# Patient Record
Sex: FEMALE | Race: BLACK OR AFRICAN AMERICAN | NOT HISPANIC OR LATINO | Employment: FULL TIME | URBAN - METROPOLITAN AREA
[De-identification: names, ages, dates, MRNs, and addresses within clinical notes are randomized per-mention and may not be internally consistent; named-entity substitution may affect disease eponyms.]

---

## 2022-02-24 ENCOUNTER — EH NON-PROVIDER (OUTPATIENT)
Dept: OCCUPATIONAL MEDICINE | Facility: CLINIC | Age: 32
End: 2022-02-24

## 2022-02-24 ENCOUNTER — HOSPITAL ENCOUNTER (OUTPATIENT)
Facility: MEDICAL CENTER | Age: 32
End: 2022-02-24
Attending: NURSE PRACTITIONER
Payer: COMMERCIAL

## 2022-02-24 ENCOUNTER — EMPLOYEE HEALTH (OUTPATIENT)
Dept: OCCUPATIONAL MEDICINE | Facility: CLINIC | Age: 32
End: 2022-02-24

## 2022-02-24 DIAGNOSIS — Z02.1 PRE-EMPLOYMENT DRUG SCREENING: Primary | ICD-10-CM

## 2022-02-24 DIAGNOSIS — Z02.1 PRE-EMPLOYMENT DRUG SCREENING: ICD-10-CM

## 2022-02-24 DIAGNOSIS — Z02.1 PRE-EMPLOYMENT HEALTH SCREENING EXAMINATION: ICD-10-CM

## 2022-02-24 LAB
AMP AMPHETAMINE: NORMAL
BAR BARBITURATES: NORMAL
BZO BENZODIAZEPINES: NORMAL
COC COCAINE: NORMAL
INT CON NEG: NORMAL
INT CON POS: NORMAL
MDMA ECSTASY: NORMAL
MET METHAMPHETAMINES: NORMAL
MTD METHADONE: NORMAL
OPI OPIATES: NORMAL
OXY OXYCODONE: NORMAL
PCP PHENCYCLIDINE: NORMAL
POC URINE DRUG SCREEN OCDRS: NEGATIVE
THC: NORMAL

## 2022-02-24 PROCEDURE — 86787 VARICELLA-ZOSTER ANTIBODY: CPT | Performed by: NURSE PRACTITIONER

## 2022-02-24 PROCEDURE — 94375 RESPIRATORY FLOW VOLUME LOOP: CPT | Performed by: NURSE PRACTITIONER

## 2022-02-24 PROCEDURE — 86765 RUBEOLA ANTIBODY: CPT | Performed by: NURSE PRACTITIONER

## 2022-02-24 PROCEDURE — 8915 PR COMPREHENSIVE PHYSICAL: Performed by: NURSE PRACTITIONER

## 2022-02-24 PROCEDURE — 86480 TB TEST CELL IMMUN MEASURE: CPT | Performed by: NURSE PRACTITIONER

## 2022-02-24 PROCEDURE — 90715 TDAP VACCINE 7 YRS/> IM: CPT | Performed by: NURSE PRACTITIONER

## 2022-02-24 PROCEDURE — 86735 MUMPS ANTIBODY: CPT | Performed by: NURSE PRACTITIONER

## 2022-02-24 PROCEDURE — 86762 RUBELLA ANTIBODY: CPT | Performed by: NURSE PRACTITIONER

## 2022-02-24 PROCEDURE — 80305 DRUG TEST PRSMV DIR OPT OBS: CPT | Performed by: NURSE PRACTITIONER

## 2022-02-25 LAB
GAMMA INTERFERON BACKGROUND BLD IA-ACNC: 0.04 IU/ML
M TB IFN-G BLD-IMP: NEGATIVE
M TB IFN-G CD4+ BCKGRND COR BLD-ACNC: 0.01 IU/ML
MEV IGG SER IA-ACNC: 2.68
MITOGEN IGNF BCKGRD COR BLD-ACNC: >10 IU/ML
MUV IGG SER IA-ACNC: 2.9
QFT TB2 - NIL TBQ2: 0 IU/ML
RUBV AB SER QL: 256 IU/ML
VZV IGG SER IA-ACNC: 0.09

## 2022-03-09 ENCOUNTER — TELEPHONE (OUTPATIENT)
Dept: OCCUPATIONAL MEDICINE | Facility: CLINIC | Age: 32
End: 2022-03-09

## 2022-03-10 ENCOUNTER — TELEPHONE (OUTPATIENT)
Dept: OCCUPATIONAL MEDICINE | Facility: CLINIC | Age: 32
End: 2022-03-10
Payer: COMMERCIAL

## 2022-03-10 NOTE — TELEPHONE ENCOUNTER
Phone Number Called: [phone] (home)       Call outcome: Did not leave a detailed message. Requested patient to call back.    Message: LEFT A VOICE MESSAGE TO CALL BACK IN REGARDS TO THE [facility]ITER.

## 2022-04-20 ENCOUNTER — EH NON-PROVIDER (OUTPATIENT)
Dept: OCCUPATIONAL MEDICINE | Facility: CLINIC | Age: 32
End: 2022-04-20

## 2022-04-20 DIAGNOSIS — Z23 NEED FOR VACCINATION: Primary | ICD-10-CM

## 2022-04-20 PROCEDURE — 90716 VAR VACCINE LIVE SUBQ: CPT | Performed by: NURSE PRACTITIONER

## 2022-04-27 ENCOUNTER — APPOINTMENT (OUTPATIENT)
Dept: OCCUPATIONAL MEDICINE | Facility: CLINIC | Age: 32
End: 2022-04-27

## 2022-07-28 ENCOUNTER — EH NON-PROVIDER (OUTPATIENT)
Dept: OCCUPATIONAL MEDICINE | Facility: CLINIC | Age: 32
End: 2022-07-28
Payer: COMMERCIAL

## 2022-07-28 DIAGNOSIS — Z02.89 ENCOUNTER FOR OCCUPATIONAL HEALTH ASSESSMENT: Primary | ICD-10-CM

## 2022-07-28 PROCEDURE — 90716 VAR VACCINE LIVE SUBQ: CPT | Performed by: NURSE PRACTITIONER

## 2023-02-07 ENCOUNTER — GYNECOLOGY VISIT (OUTPATIENT)
Dept: OBGYN | Facility: CLINIC | Age: 33
End: 2023-02-07
Payer: COMMERCIAL

## 2023-02-07 VITALS
HEIGHT: 64 IN | SYSTOLIC BLOOD PRESSURE: 124 MMHG | BODY MASS INDEX: 25.27 KG/M2 | DIASTOLIC BLOOD PRESSURE: 86 MMHG | WEIGHT: 148 LBS

## 2023-02-07 DIAGNOSIS — Z31.69 ENCOUNTER FOR INFERTILITY COUNSELING: Primary | ICD-10-CM

## 2023-02-07 DIAGNOSIS — Z98.890 HISTORY OF ELECTIVE ABORTION: ICD-10-CM

## 2023-02-07 PROCEDURE — 99203 OFFICE O/P NEW LOW 30 MIN: CPT | Performed by: OBSTETRICS & GYNECOLOGY

## 2023-02-07 NOTE — PROGRESS NOTES
"New GYN Problem Note    CC:   Family planning    HPI:   Patient is a 32 y.o.  who presents complaining of concerns for getting pregnant.  She states right off that her and her partner of 8 months have not been TRYING to get pregnant but that without preventing they haven't gotten pregnant and she wants to make sure she is able to.  She has, however, been pregnant 5 times, 4 of which she had an elective surgical  for.  The most recent  was in 2018 and she reports it was \"far more painful than the others\" and since that time she has been concerned about that.  Her only child is now 15 years old.      She initially reports regular monthly periods but further history then states some irregularity and some missed periods. Her understanding and relay of this information is somewhat confusing.       GYNECOLOGIC HISTORY:   Current Sexual Activity: yes  History of sexually transmitted diseases? No  Abnormal discharge? No     Menstrual History  Patient's last menstrual period was Patient's last menstrual period was 2023 (exact date).  Periods are mostly regular  lasting 3-5 days.  She will begin to track cycle length    Clots or heavy flow: No  Intermenstrual bleeding/spotting: No  Sexual problems: No  Significant pelvic pain: No  Bothersome PMS: No  Bothersome menopausal symptoms: No     Contraception  none     Pap History  [facility]  Hx Moderate or Severe Dysplasia : No     Sexually active:    Social History     Substance and Sexual Activity   Sexual Activity Yes    Partners: Male    Birth control/protection: None       OBSTETRIC HISTORY:  OB History    Para Term  AB Living   5 1 1   4 1   SAB IAB Ectopic Molar Multiple Live Births             1      # Outcome Date GA Lbr Tahir/2nd Weight Sex Delivery Anes PTL Lv   5 AB  6w0d          4 AB 2016 6w0d          3 AB  6w0d          2 Term 08 40w0d  7 lb 8 oz F Vag-Spont EPI N NIKA   1 AB  " "6w0d              MEDICAL HISTORY:  History reviewed. No pertinent past medical history.    SURGICAL HISTORY:  History reviewed. No pertinent surgical history.    FAMILY HISTORY:  Family History   Problem Relation Age of Onset    No Known Problems Mother     No Known Problems Father        ALLERGIES / REACTIONS:  No Known Allergies    SOCIAL HISTORY:   reports that she has quit smoking. Her smoking use included cigarettes. She has never used smokeless tobacco. She reports current alcohol use. She reports current drug use. Drug: Marijuana.    ROS:   Positive ROS: none  Gen: no fevers or chills, no significant weight loss or gain, excessive fatigue  Respiratory:  no cough or dyspnea  Cardiac:  no chest pain, no palpitations, no syncope  Breast: no breast discharge, pain, lump or skin changes  GI:  no heartburn, no abdominal pain, no nausea or vomiting  Urinary: no dysuria, urgency, frequency, incontinence   Psych: no depression or anxiety  Neuro: no migraines with aura, fainting spells, numbness or tingling  Extremities: no joint pain, persistently swollen ankles, recurrent leg cramps       PHYSICAL EXAMINATION:  Vital Signs:   Vitals:    02/07/23 0919   BP: 124/86   BP Location: Right arm   Patient Position: Sitting   BP Cuff Size: Adult   Weight: 148 lb   Height: 5' 4\"     Body mass index is 25.4 kg/m².  Constitutional: The patient is well developed and well nourished.  Psychiatric: Patient is oriented to time place and person.   Skin: No rash observed.  Neck: Neck appears symmetric.  Respiratory: normal effort  Abdomen: Soft, non-tender.  Pelvic Exam:     External female genitalia: normal appearance    Urethra - no lesions, no erythema    Vagina: moist, pink, normal ruggae    Cervix: pink, smooth, no lesions, no CMT; no obvious scarring noted    Uterus - non-tender, normal size, shape, contour, mobile, anteverted    Ovaries: non-tender, no appreciable masses  Extremeties: Legs are symmetric and without tenderness. " There is no edema present.    ASSESSMENT AND PLAN:  32 y.o.       1. History of elective    - discussed she clearly CAN get pregnant given her history.  Only concern would be uterine/endometrial injury or scarring from repeated abortions. Will send to [facility] for [facility] evaluation  - Referral to Infertility    2. Encounter for infertility counseling   - extensively discussed menstrual cycle and timed intercourse   - encouraged pt to track cycle length for better understanding of her ovulatory pattern.  Can then begin to try timed intercourse and use basal body temperature measurements or ovulation predictor kits   - will have partner get semen analysis from [facility] as well to exclude male factor (Malik Springerpaolo  )   - overall offered reassurance at this time and future option to assist with reproduction with OI meds if >1 year of concerted trying with no conception    - Referral to Infertility    [facility]    Selene Calloway D.O.

## 2023-02-07 NOTE — PATIENT INSTRUCTIONS
[facility]  Located in: [facility]  Address: [address], [place] [address]  Hours:   Open ? Closes 4?PM    Phone: [phone]    [facility] and STD screenings and medical records from abortions

## 2023-03-13 ENCOUNTER — HOSPITAL ENCOUNTER (EMERGENCY)
Facility: MEDICAL CENTER | Age: 33
End: 2023-03-13
Attending: EMERGENCY MEDICINE
Payer: COMMERCIAL

## 2023-03-13 VITALS
WEIGHT: 151.46 LBS | HEART RATE: 98 BPM | OXYGEN SATURATION: 100 % | TEMPERATURE: 96.7 F | SYSTOLIC BLOOD PRESSURE: 135 MMHG | RESPIRATION RATE: 18 BRPM | DIASTOLIC BLOOD PRESSURE: 99 MMHG | HEIGHT: 62 IN | BODY MASS INDEX: 27.87 KG/M2

## 2023-03-13 DIAGNOSIS — S61.210A LACERATION OF RIGHT INDEX FINGER WITHOUT FOREIGN BODY WITHOUT DAMAGE TO NAIL, INITIAL ENCOUNTER: ICD-10-CM

## 2023-03-13 PROCEDURE — 304999 HCHG REPAIR-SIMPLE/INTERMED LEVEL 1

## 2023-03-13 PROCEDURE — 99282 EMERGENCY DEPT VISIT SF MDM: CPT

## 2023-03-13 PROCEDURE — 303747 HCHG EXTRA SUTURE

## 2023-03-13 PROCEDURE — 304217 HCHG IRRIGATION SYSTEM

## 2023-03-13 RX ORDER — LIDOCAINE HYDROCHLORIDE 20 MG/ML
20 INJECTION, SOLUTION INFILTRATION; PERINEURAL ONCE
Status: DISCONTINUED | OUTPATIENT
Start: 2023-03-13 | End: 2023-03-13 | Stop reason: HOSPADM

## 2023-03-13 NOTE — ED PROVIDER NOTES
ED Provider Note    Scribed for Alyx Landis M.D. by Corine Laughlin. 3/13/2023, 7:31 AM.    Primary care provider: Pcp Not In Computer  Means of arrival: Walk in  History obtained from: patient   History limited by: none    CHIEF COMPLAINT  Chief Complaint   Patient presents with    Laceration     R index finger laceration, was washing dishes and cut finger on a knife, unsure if she was able to move it afterwards, bleeding controlled with pressure dressing in triage, unsure of last tetanus        HPI/ROS  Desire Mack is a 32 y.o. female who presents to the Emergency Department laceration onset a few hours ago. Patient describes she was washing a knife and cut her right index finger.  Denies any other injuries.  Reports mild stabbing pain to the site of laceration.  She is unsure of her last tetanus.    Pharmacy team confirms that her Tdap is up-to-date.    EXTERNAL RECORDS REVIEWED  None    LIMITATION TO HISTORY   Select: : None    OUTSIDE HISTORIAN(S):  none      PAST MEDICAL HISTORY   Unknown last tetanus.    SURGICAL HISTORY  patient denies any surgical history    SOCIAL HISTORY  Social History     Tobacco Use    Smoking status: Former     Types: Cigarettes    Smokeless tobacco: Never   Vaping Use    Vaping Use: Every day    Substances: Nicotine, Flavoring    Devices: Pre-filled or refillable cartridge, Pre-filled pod   Substance Use Topics    Alcohol use: Yes     Comment: couple times a month    Drug use: Not Currently     Types: Marijuana      Social History     Substance and Sexual Activity   Drug Use Not Currently    Types: Marijuana       FAMILY HISTORY  Family History   Problem Relation Age of Onset    No Known Problems Mother     No Known Problems Father        CURRENT MEDICATIONS  Home Medications       Reviewed by Herlinda Leon R.N. (Registered Nurse) on 03/13/23 at 0633  Med List Status: Not Addressed     Medication Last Dose Status        Patient Saleem Taking any Medications                      "      ALLERGIES  No Known Allergies    PHYSICAL EXAM  VITAL SIGNS: BP (!) 149/99   Pulse (!) 111   Temp 36 °C (96.8 °F) (Temporal)   Resp 18   Ht 1.575 m (5' 2\")   Wt 68.7 kg (151 lb 7.3 oz)   LMP 03/11/2023   SpO2 100% Comment: Room air  BMI 27.70 kg/m²   Vitals reviewed by myself.  Physical Exam  Nursing note and vitals reviewed.  Constitutional: Well-developed and well-nourished. Anxious appearing.  HENT: Head is normocephalic and atraumatic.  Eyes: extra-ocular movements intact  Cardiovascular: Tachycardic rate and regular rhythm.  Brisk capillary refill present in all distal fingertips  Pulmonary/Chest: Breath sounds normal. No wheezes or rales.   Musculoskeletal: Extremities exhibit normal range of motion without edema.  Patient has full range of motion of the right index finger at the MCP, PIP and DIP joints, see laceration noted below  Neurological: Awake and alert sensation intact in all distal fingertips  Skin: Superficial 3 cm laceration to the proximal palmar aspect of the right index finger. Skin is warm and dry. No rash.     PROCEDURES  Laceration Repair Procedure Note    Indication: Laceration    Procedure: The patient was placed in the appropriate position and anesthesia around the laceration was obtained with a full digital block of the right index finger using 2% Lidocaine without epinephrine. The area was then irrigated with normal saline. The laceration was closed with 4-0 Ethilon using interrupted sutures. There were no additional lacerations requiring repair. The wound area was then dressed with a sterile dressing.      Total repaired wound length: 3 cm.     Other Items: Suture count: 6    The patient tolerated the procedure well.    Complications: None      COURSE & MEDICAL DECISION MAKING    ED Observation Status? No; Patient does not meet criteria for ED Observation.     INITIAL ASSESSMENT AND PLAN    Patient is a 32-year-old female who comes in for evaluation of laceration.  " Differential diagnosis includes laceration, deep structure injury, neurovascular injury.  On exam she is neurovascularly intact.  No evidence of deep structure injury as she has full range of motion at the PIP and DIP joints.  Therefore will repair laceration, patient is amenable to this plan.       REASSESSMENTS   7:33 AM - Patient seen and examined at bedside. Informed the patient we will need to repair her laceration with sutures. Patient verbalizes understanding and agreement to this plan of care.     7:59 AM - Digital block preformed at this time to numb for the laceration repair.    8:13 AM - Laceration repair procedure preformed at this time (see procedure note above for details). Discussed plan for discharge and return precautions. She verbalizes agreement with discharge and plan of care.     ER COURSE AND FINAL DISPOSITION   Patient's initial vitals notable for tachycardia and hypertension, she is having a lot of discomfort and anxiety regarding the laceration.  After the digit was anesthetized she felt greatly improved and tachycardia resolved.  Laceration was repaired by myself, see procedure note above for details.  Patient is given wound care instructions and advised to have sutures removed in 7 days.  She has been given strict return precautions and discharged in stable condition.    ADDITIONAL PROBLEM LIST AND RESOURCE UTILIZATION    Additional problems aside from the chief complaint that I have addressed: None    I have discussed management of the patient with the following physicians and EBENEZER's: none    Discussion of management with other QHP or appropriate source(s): None     Escalation of care considered, and ultimately not performed: Diagnostic imaging    Barriers to care at this time, including but not limited to:  none .     Decision tools and prescription drugs considered including, but not limited to: See above.    Please see review of records as noted above    The patient will return for new  or worsening symptoms and is stable at the time of discharge.    DISPOSITION:  Patient will be discharged home in stable condition.    FOLLOW UP:  [facility]  [address]  [facility] [address]  [phone]          FINAL IMPRESSION  1. Laceration of right index finger without foreign body without damage to nail, initial encounter          Corine GRAY (Scribe), am scribing for, and in the presence of, Alyx Landis M.D..    Electronically signed by: Corine Laughlin (Ianiberick), 3/13/2023    Alyx GRAY M.D. personally performed the services described in this documentation, as scribed by Corine Laughlin in my presence, and it is both accurate and complete.    The note accurately reflects work and decisions made by me.  Alyx Landis M.D.  3/13/2023  12:56 PM

## 2023-03-13 NOTE — ED TRIAGE NOTES
Chief Complaint   Patient presents with    Laceration     R index finger laceration, was washing dishes and cut finger on a knife, unsure if she was able to move it afterwards, bleeding controlled with pressure dressing in triage, unsure of last tetanus         Pt ambulatory to triage for above complaint.       Pt is alert/oriented and follows commands. Pt speaking in full sentences and responds appropriately to questions. No acute distress noted in triage and respirations are even and unlabored.      Pt placed in lobby and educated on triage process. Pt encouraged to alert staff for any changes in condition

## 2023-11-13 ENCOUNTER — OFFICE VISIT (OUTPATIENT)
Dept: MEDICAL GROUP | Facility: PHYSICIAN GROUP | Age: 33
End: 2023-11-13
Payer: COMMERCIAL

## 2023-11-13 ENCOUNTER — HOSPITAL ENCOUNTER (OUTPATIENT)
Facility: MEDICAL CENTER | Age: 33
End: 2023-11-13
Attending: NURSE PRACTITIONER
Payer: COMMERCIAL

## 2023-11-13 VITALS
SYSTOLIC BLOOD PRESSURE: 122 MMHG | HEART RATE: 94 BPM | DIASTOLIC BLOOD PRESSURE: 70 MMHG | WEIGHT: 154 LBS | HEIGHT: 62 IN | TEMPERATURE: 98.3 F | BODY MASS INDEX: 28.34 KG/M2 | OXYGEN SATURATION: 100 %

## 2023-11-13 DIAGNOSIS — Z11.3 ENCOUNTER FOR SCREENING EXAMINATION FOR SEXUALLY TRANSMITTED DISEASE: ICD-10-CM

## 2023-11-13 DIAGNOSIS — R10.2 PELVIC PAIN: ICD-10-CM

## 2023-11-13 DIAGNOSIS — N94.6 DYSMENORRHEA: ICD-10-CM

## 2023-11-13 DIAGNOSIS — N93.0 PCB (POST COITAL BLEEDING): Primary | ICD-10-CM

## 2023-11-13 LAB
APPEARANCE UR: CLEAR
BILIRUB UR STRIP-MCNC: NEGATIVE MG/DL
COLOR UR AUTO: YELLOW
GLUCOSE UR STRIP.AUTO-MCNC: NEGATIVE MG/DL
KETONES UR STRIP.AUTO-MCNC: NEGATIVE MG/DL
LEUKOCYTE ESTERASE UR QL STRIP.AUTO: NEGATIVE
NITRITE UR QL STRIP.AUTO: NEGATIVE
PH UR STRIP.AUTO: 5.5 [PH] (ref 5–8)
POCT INT CON NEG: NEGATIVE
POCT INT CON POS: POSITIVE
POCT URINE PREGNANCY TEST: NEGATIVE
PROT UR QL STRIP: NEGATIVE MG/DL
RBC UR QL AUTO: NORMAL
SP GR UR STRIP.AUTO: 1.3
UROBILINOGEN UR STRIP-MCNC: 0.2 MG/DL

## 2023-11-13 PROCEDURE — 87591 N.GONORRHOEAE DNA AMP PROB: CPT

## 2023-11-13 PROCEDURE — 87086 URINE CULTURE/COLONY COUNT: CPT

## 2023-11-13 PROCEDURE — 87491 CHLMYD TRACH DNA AMP PROBE: CPT

## 2023-11-13 PROCEDURE — 81025 URINE PREGNANCY TEST: CPT | Performed by: NURSE PRACTITIONER

## 2023-11-13 PROCEDURE — 99214 OFFICE O/P EST MOD 30 MIN: CPT | Performed by: NURSE PRACTITIONER

## 2023-11-13 PROCEDURE — 81002 URINALYSIS NONAUTO W/O SCOPE: CPT | Performed by: NURSE PRACTITIONER

## 2023-11-13 PROCEDURE — 3078F DIAST BP <80 MM HG: CPT | Performed by: NURSE PRACTITIONER

## 2023-11-13 PROCEDURE — 3074F SYST BP LT 130 MM HG: CPT | Performed by: NURSE PRACTITIONER

## 2023-11-13 ASSESSMENT — ENCOUNTER SYMPTOMS
VOMITING: 0
NAUSEA: 0
SHORTNESS OF BREATH: 0
CHILLS: 0
ABDOMINAL PAIN: 1
FEVER: 0
HEADACHES: 0
DIZZINESS: 0

## 2023-11-13 ASSESSMENT — PATIENT HEALTH QUESTIONNAIRE - PHQ9: CLINICAL INTERPRETATION OF PHQ2 SCORE: 0

## 2023-11-13 NOTE — PROGRESS NOTES
"Subjective:     CC: STI screening    HPI:   Desire presents today with    Problem   Pcb (Post Coital Bleeding)    Reports 1 episode of brown spotting during and after intercourse yesterday. Reports having unprotected intercourse. States that for the past week, reports some mild lower abdominal cramping.   Hx of STI at age 19. Has a history of ovarian cyst, about 7 years ago.   Denies dysuria, urinary frequency, urinary urgency, hematuria, vaginal discharge, vaginal itching, dyspareunia.      Pelvic Pain    Onset: 1-2 weeks ago  Reports she has been experiencing increased pelvic pain over the past week.   LMP 10/30/2023, states that sometimes interval between menses is irregular (does not track her cycles, but states interval may be longer than 35 days)  Denies nausea/vomiting, changes in bowel movements urinary symptoms or vaginal discharge.      Dysmenorrhea    For the past 2-3 cycles, she has experienced \"excruciating\" pain during menses. Also reports heavy bleeding. Both are a change from her normal. Menses last about 3 days.        No current outpatient medications on file.     No current facility-administered medications for this visit.       ROS:  Review of Systems   Constitutional:  Negative for chills and fever.   Respiratory:  Negative for shortness of breath.    Cardiovascular:  Negative for chest pain.   Gastrointestinal:  Positive for abdominal pain. Negative for nausea and vomiting.   Neurological:  Negative for dizziness and headaches.       Objective:     Exam:  /70 (BP Location: Right arm, Patient Position: Sitting, BP Cuff Size: Adult)   Pulse 94   Temp 36.8 °C (98.3 °F) (Temporal)   Ht 1.575 m (5' 2\")   Wt 69.9 kg (154 lb)   SpO2 100%   BMI 28.17 kg/m²  Body mass index is 28.17 kg/m².    Physical Exam  Constitutional:       General: She is not in acute distress.     Appearance: Normal appearance. She is not ill-appearing.   HENT:      Mouth/Throat:      Mouth: Mucous membranes are " moist.   Eyes:      Conjunctiva/sclera: Conjunctivae normal.      Pupils: Pupils are equal, round, and reactive to light.   Cardiovascular:      Rate and Rhythm: Normal rate and regular rhythm.      Heart sounds: Normal heart sounds.   Pulmonary:      Effort: Pulmonary effort is normal. No respiratory distress.      Breath sounds: Normal breath sounds. No wheezing, rhonchi or rales.   Abdominal:      General: Bowel sounds are normal. There is no distension.      Palpations: Abdomen is soft.      Tenderness: There is abdominal tenderness in the right lower quadrant and suprapubic area. There is no right CVA tenderness, left CVA tenderness or guarding.   Lymphadenopathy:      Cervical: No cervical adenopathy.   Skin:     General: Skin is warm and dry.   Neurological:      General: No focal deficit present.      Mental Status: She is alert and oriented to person, place, and time.   Psychiatric:         Mood and Affect: Mood normal.         Behavior: Behavior normal.       Lab reviewed:   11/13/23 09:48   POC Urine Pregnancy Test Negative     Lab Results   Component Value Date/Time    POCCOLOR YELLOW 11/13/2023 09:49 AM    POCAPPEAR CLEAR 11/13/2023 09:49 AM    POCLEUKEST NEGATIVE 11/13/2023 09:49 AM    POCNITRITE NEGATIVE 11/13/2023 09:49 AM    POCUROBILIGE 0.2 11/13/2023 09:49 AM    POCPROTEIN NEGATIVE 11/13/2023 09:49 AM    POCURPH 5.5 11/13/2023 09:49 AM    POCBLOOD TRACE 11/13/2023 09:49 AM    POCSPGRV 1.30 11/13/2023 09:49 AM    POCKETONES NEGATIVE 11/13/2023 09:49 AM    POCBILIRUBIN NEGATIVE 11/13/2023 09:49 AM    POCGLUCUA NEGATIVE 11/13/2023 09:49 AM      Assessment & Plan:     33 y.o. female with the following -     1. PCB (post coital bleeding)  2. Pelvic pain  New problem. Etiology unclear. POC UA remarkable for trace blood. Given pelvic pain, urine culture ordered. STI screening ordered. I will also order imaging given new symptom of pelvic pain and her history of ovarian cysts. ER precautions discussed.  -  Chlamydia/GC, PCR (Urine); Future  - POCT PREGNANCY  - POCT Urinalysis  - CBC WITH DIFFERENTIAL; Future  - Comp Metabolic Panel; Future  - TSH WITH REFLEX TO FT4; Future  - URINE CULTURE(NEW); Future  - US-PELVIC COMPLETE (TRANSABDOMINAL/TRANSVAGINAL) (COMBO); Future    3. Dysmenorrhea  New problem. Will evaluate change in pattern of menses with TVUS. I also discussed OTC analgesics (Naproxen/Ibuprofen) as needed for dysmenorrhea.    4. Encounter for screening examination for sexually transmitted disease  Safe sex practices recommended. Will screen for STIs.  - Chlamydia/GC, PCR (Urine); Future  - HEP C VIRUS ANTIBODY; Future  - HIV AG/AB COMBO ASSAY SCREENING; Future  - T.PALLIDUM AB ELEN (SCREENING); Future  - HEP B CORE AB TOTAL; Future  - HEP B SURFACE ANTIGEN; Future  - HEP B SURFACE AB; Future    No follow-ups on file.    Please note that this dictation was created using voice recognition software. I have made every reasonable attempt to correct obvious errors, but I expect that there are errors of grammar and possibly content that I did not discover before finalizing the note.

## 2023-11-14 ENCOUNTER — HOSPITAL ENCOUNTER (OUTPATIENT)
Dept: LAB | Facility: MEDICAL CENTER | Age: 33
End: 2023-11-14
Attending: NURSE PRACTITIONER
Payer: COMMERCIAL

## 2023-11-14 DIAGNOSIS — Z11.3 ENCOUNTER FOR SCREENING EXAMINATION FOR SEXUALLY TRANSMITTED DISEASE: ICD-10-CM

## 2023-11-14 DIAGNOSIS — R10.2 PELVIC PAIN: ICD-10-CM

## 2023-11-14 LAB
ALBUMIN SERPL BCP-MCNC: 5 G/DL (ref 3.2–4.9)
ALBUMIN/GLOB SERPL: 1.7 G/DL
ALP SERPL-CCNC: 78 U/L (ref 30–99)
ALT SERPL-CCNC: 22 U/L (ref 2–50)
ANION GAP SERPL CALC-SCNC: 13 MMOL/L (ref 7–16)
AST SERPL-CCNC: 22 U/L (ref 12–45)
BASOPHILS # BLD AUTO: 0.4 % (ref 0–1.8)
BASOPHILS # BLD: 0.03 K/UL (ref 0–0.12)
BILIRUB SERPL-MCNC: 0.4 MG/DL (ref 0.1–1.5)
BUN SERPL-MCNC: 11 MG/DL (ref 8–22)
C TRACH DNA SPEC QL NAA+PROBE: NEGATIVE
CALCIUM ALBUM COR SERPL-MCNC: 8.7 MG/DL (ref 8.5–10.5)
CALCIUM SERPL-MCNC: 9.5 MG/DL (ref 8.5–10.5)
CHLORIDE SERPL-SCNC: 104 MMOL/L (ref 96–112)
CO2 SERPL-SCNC: 23 MMOL/L (ref 20–33)
CREAT SERPL-MCNC: 0.61 MG/DL (ref 0.5–1.4)
EOSINOPHIL # BLD AUTO: 0.02 K/UL (ref 0–0.51)
EOSINOPHIL NFR BLD: 0.3 % (ref 0–6.9)
ERYTHROCYTE [DISTWIDTH] IN BLOOD BY AUTOMATED COUNT: 40.6 FL (ref 35.9–50)
GFR SERPLBLD CREATININE-BSD FMLA CKD-EPI: 120 ML/MIN/1.73 M 2
GLOBULIN SER CALC-MCNC: 3 G/DL (ref 1.9–3.5)
GLUCOSE SERPL-MCNC: 97 MG/DL (ref 65–99)
HBV CORE AB SERPL QL IA: NONREACTIVE
HBV SURFACE AB SERPL IA-ACNC: 358 MIU/ML (ref 0–10)
HBV SURFACE AG SER QL: NORMAL
HCT VFR BLD AUTO: 44.2 % (ref 37–47)
HCV AB SER QL: NORMAL
HGB BLD-MCNC: 13.9 G/DL (ref 12–16)
HIV 1+2 AB+HIV1 P24 AG SERPL QL IA: NORMAL
IMM GRANULOCYTES # BLD AUTO: 0.04 K/UL (ref 0–0.11)
IMM GRANULOCYTES NFR BLD AUTO: 0.5 % (ref 0–0.9)
LYMPHOCYTES # BLD AUTO: 2 K/UL (ref 1–4.8)
LYMPHOCYTES NFR BLD: 25.1 % (ref 22–41)
MCH RBC QN AUTO: 27.3 PG (ref 27–33)
MCHC RBC AUTO-ENTMCNC: 31.4 G/DL (ref 32.2–35.5)
MCV RBC AUTO: 86.8 FL (ref 81.4–97.8)
MONOCYTES # BLD AUTO: 0.73 K/UL (ref 0–0.85)
MONOCYTES NFR BLD AUTO: 9.2 % (ref 0–13.4)
N GONORRHOEA DNA SPEC QL NAA+PROBE: NEGATIVE
NEUTROPHILS # BLD AUTO: 5.15 K/UL (ref 1.82–7.42)
NEUTROPHILS NFR BLD: 64.5 % (ref 44–72)
NRBC # BLD AUTO: 0 K/UL
NRBC BLD-RTO: 0 /100 WBC (ref 0–0.2)
PLATELET # BLD AUTO: 322 K/UL (ref 164–446)
PMV BLD AUTO: 9.7 FL (ref 9–12.9)
POTASSIUM SERPL-SCNC: 3.9 MMOL/L (ref 3.6–5.5)
PROT SERPL-MCNC: 8 G/DL (ref 6–8.2)
RBC # BLD AUTO: 5.09 M/UL (ref 4.2–5.4)
SODIUM SERPL-SCNC: 140 MMOL/L (ref 135–145)
SPECIMEN SOURCE: NORMAL
T PALLIDUM AB SER QL IA: NORMAL
TSH SERPL DL<=0.005 MIU/L-ACNC: 0.43 UIU/ML (ref 0.38–5.33)
WBC # BLD AUTO: 8 K/UL (ref 4.8–10.8)

## 2023-11-14 PROCEDURE — 80053 COMPREHEN METABOLIC PANEL: CPT

## 2023-11-14 PROCEDURE — 86803 HEPATITIS C AB TEST: CPT

## 2023-11-14 PROCEDURE — 86704 HEP B CORE ANTIBODY TOTAL: CPT

## 2023-11-14 PROCEDURE — 86706 HEP B SURFACE ANTIBODY: CPT

## 2023-11-14 PROCEDURE — 87389 HIV-1 AG W/HIV-1&-2 AB AG IA: CPT

## 2023-11-14 PROCEDURE — 87340 HEPATITIS B SURFACE AG IA: CPT

## 2023-11-14 PROCEDURE — 36415 COLL VENOUS BLD VENIPUNCTURE: CPT

## 2023-11-14 PROCEDURE — 85025 COMPLETE CBC W/AUTO DIFF WBC: CPT

## 2023-11-14 PROCEDURE — 86780 TREPONEMA PALLIDUM: CPT

## 2023-11-14 PROCEDURE — 84443 ASSAY THYROID STIM HORMONE: CPT

## 2023-11-16 LAB
BACTERIA UR CULT: NORMAL
SIGNIFICANT IND 70042: NORMAL
SITE SITE: NORMAL
SOURCE SOURCE: NORMAL

## 2024-01-02 ENCOUNTER — OFFICE VISIT (OUTPATIENT)
Dept: MEDICAL GROUP | Facility: PHYSICIAN GROUP | Age: 34
End: 2024-01-02
Payer: MEDICAID

## 2024-01-02 VITALS
WEIGHT: 152 LBS | TEMPERATURE: 98 F | OXYGEN SATURATION: 98 % | BODY MASS INDEX: 27.97 KG/M2 | HEIGHT: 62 IN | HEART RATE: 104 BPM | DIASTOLIC BLOOD PRESSURE: 70 MMHG | SYSTOLIC BLOOD PRESSURE: 118 MMHG

## 2024-01-02 DIAGNOSIS — Z02.89 ENCOUNTER FOR COMPLETION OF FORM WITH PATIENT: ICD-10-CM

## 2024-01-02 DIAGNOSIS — F43.21 ADJUSTMENT DISORDER WITH DEPRESSED MOOD: Primary | ICD-10-CM

## 2024-01-02 DIAGNOSIS — F43.21 GRIEF: ICD-10-CM

## 2024-01-02 PROCEDURE — 99213 OFFICE O/P EST LOW 20 MIN: CPT | Performed by: NURSE PRACTITIONER

## 2024-01-02 PROCEDURE — 3074F SYST BP LT 130 MM HG: CPT | Performed by: NURSE PRACTITIONER

## 2024-01-02 PROCEDURE — 3078F DIAST BP <80 MM HG: CPT | Performed by: NURSE PRACTITIONER

## 2024-01-02 ASSESSMENT — PATIENT HEALTH QUESTIONNAIRE - PHQ9
SUM OF ALL RESPONSES TO PHQ QUESTIONS 1-9: 16
5. POOR APPETITE OR OVEREATING: 2 - MORE THAN HALF THE DAYS
CLINICAL INTERPRETATION OF PHQ2 SCORE: 6

## 2024-01-02 ASSESSMENT — ENCOUNTER SYMPTOMS
HEADACHES: 0
VOMITING: 0
CHILLS: 0
NAUSEA: 0
DIZZINESS: 0
FEVER: 0
ABDOMINAL PAIN: 0
SHORTNESS OF BREATH: 0
WEIGHT LOSS: 0

## 2024-01-02 ASSESSMENT — FIBROSIS 4 INDEX: FIB4 SCORE: 0.5

## 2024-01-02 NOTE — PROGRESS NOTES
Subjective:     CC: [place] paperwork    HPI:   Desire presents today with    Problem   Grief    She presents for [facility] paperwork. She has been on leave since 12/28/2023, with an expected return date of 01/19/2024.  She had several significant life events happen recently and felt unable to function and thus, decided to request a temporary leave. She reports she lost her father in 09/2023, step father in 12/2023 and a cousin unexpectedly in a car accident in 12/2023. She has a history of loss during the holidays and this time can be triggering for her. She also endorses some work related stress.   She feels low energy, depressed/sad and either sleeps too much or too little. She does not want to be around people. Denies a personal history of depression, but reports a strong family history of depression - mom, dad, sister. She has a good support system with her friends. Her coping skills include working out at the gym, talking with friends and going for walks. She has not been doing this lately.        Depression Screening    Little interest or pleasure in doing things?  3 - nearly every day   Feeling down, depressed , or hopeless? 3 - nearly every day   Trouble falling or staying asleep, or sleeping too much?  2 - more than half the days   Feeling tired or having little energy?  3 - nearly every day   Poor appetite or overeating?  2 - more than half the days   Feeling bad about yourself - or that you are a failure or have let yourself or your family down? 1 - several days   Trouble concentrating on things, such as reading the newspaper or watching television? 2 - more than half the days   Moving or speaking so slowly that other people could have noticed.  Or the opposite - being so fidgety or restless that you have been moving around a lot more than usual?  0 - not at all   Thoughts that you would be better off dead, or of hurting yourself?  0 - not at all   Patient Health Questionnaire Score: 16       If depressive  "symptoms identified deferred to follow up visit unless specifically addressed in assesment and plan.    Interpretation of PHQ-9 Total Score   Score Severity   1-4 No Depression   5-9 Mild Depression   10-14 Moderate Depression   15-19 Moderately Severe Depression   20-27 Severe Depression    Health Maintenance: Completed    No current outpatient medications on file.     No current facility-administered medications for this visit.     ROS:  Review of Systems   Constitutional:  Positive for malaise/fatigue. Negative for chills, fever and weight loss.   Respiratory:  Negative for shortness of breath.    Cardiovascular:  Negative for chest pain.   Gastrointestinal:  Negative for abdominal pain, nausea and vomiting.   Neurological:  Negative for dizziness and headaches.     Objective:     Exam:  /70 (BP Location: Right arm, Patient Position: Sitting, BP Cuff Size: Adult)   Pulse (!) 104   Temp 36.7 °C (98 °F) (Temporal)   Ht 1.575 m (5' 2\")   Wt 68.9 kg (152 lb)   LMP 12/27/2023   SpO2 98%   BMI 27.80 kg/m²  Body mass index is 27.8 kg/m².    Physical Exam  Constitutional:       Appearance: Normal appearance.   Eyes:      Conjunctiva/sclera: Conjunctivae normal.      Pupils: Pupils are equal, round, and reactive to light.   Cardiovascular:      Rate and Rhythm: Normal rate and regular rhythm.      Heart sounds: Normal heart sounds. No murmur heard.  Pulmonary:      Effort: Pulmonary effort is normal. No respiratory distress.      Breath sounds: Normal breath sounds.   Musculoskeletal:      Right lower leg: No edema.      Left lower leg: No edema.   Lymphadenopathy:      Cervical: No cervical adenopathy.   Skin:     General: Skin is warm and dry.   Neurological:      General: No focal deficit present.      Mental Status: She is alert and oriented to person, place, and time.   Psychiatric:         Mood and Affect: Mood normal.         Behavior: Behavior normal.       Assessment & Plan:     33 y.o. female with " the following -     1. Adjustment disorder with depressed mood  2. Grief  Acute problem. Recommended she re-engage in her hobbies and coping skills to aid her mental health. Additionally, discussed potential for pharmacotherapy to treat insomnia temporarily. She reports melatonin has been working sufficiently well for her. Lastly, she was open to a therapy referral, which was initiated today.   - Referral to Behavioral Health    3. Encounter for completion of form with patient  [facility] paperwork for continuous leave followed by as needed intermittent leave completed. Copy made and retained for our records. Original returned to patient.     I spent a total of 25 minutes with record review, exam, communication with the patient, communication with other providers, and documentation of this encounter.    Return if symptoms worsen or fail to improve.    Please note that this dictation was created using voice recognition software. I have made every reasonable attempt to correct obvious errors, but I expect that there are errors of grammar and possibly content that I did not discover before finalizing the note.

## 2024-01-05 ENCOUNTER — APPOINTMENT (OUTPATIENT)
Dept: MEDICAL GROUP | Facility: PHYSICIAN GROUP | Age: 34
End: 2024-01-05
Payer: MEDICAID

## 2024-02-28 ENCOUNTER — OFFICE VISIT (OUTPATIENT)
Dept: MEDICAL GROUP | Facility: MEDICAL CENTER | Age: 34
End: 2024-02-28
Attending: FAMILY MEDICINE
Payer: MEDICAID

## 2024-02-28 VITALS
SYSTOLIC BLOOD PRESSURE: 108 MMHG | RESPIRATION RATE: 16 BRPM | HEIGHT: 62 IN | BODY MASS INDEX: 27.77 KG/M2 | OXYGEN SATURATION: 99 % | TEMPERATURE: 97.7 F | WEIGHT: 150.9 LBS | HEART RATE: 99 BPM | DIASTOLIC BLOOD PRESSURE: 72 MMHG

## 2024-02-28 DIAGNOSIS — Z13.6 SCREENING FOR CARDIOVASCULAR CONDITION: ICD-10-CM

## 2024-02-28 DIAGNOSIS — F41.9 ANXIETY AND DEPRESSION: ICD-10-CM

## 2024-02-28 DIAGNOSIS — K52.9 GASTROENTERITIS: ICD-10-CM

## 2024-02-28 DIAGNOSIS — F32.A ANXIETY AND DEPRESSION: ICD-10-CM

## 2024-02-28 PROCEDURE — 3078F DIAST BP <80 MM HG: CPT | Performed by: FAMILY MEDICINE

## 2024-02-28 PROCEDURE — 3074F SYST BP LT 130 MM HG: CPT | Performed by: FAMILY MEDICINE

## 2024-02-28 PROCEDURE — 99214 OFFICE O/P EST MOD 30 MIN: CPT | Performed by: FAMILY MEDICINE

## 2024-02-28 RX ORDER — ESCITALOPRAM OXALATE 10 MG/1
10 TABLET ORAL DAILY
Qty: 30 TABLET | Refills: 1 | Status: SHIPPED | OUTPATIENT
Start: 2024-02-28

## 2024-02-28 ASSESSMENT — PATIENT HEALTH QUESTIONNAIRE - PHQ9
5. POOR APPETITE OR OVEREATING: 3 - NEARLY EVERY DAY
CLINICAL INTERPRETATION OF PHQ2 SCORE: 3
SUM OF ALL RESPONSES TO PHQ QUESTIONS 1-9: 14

## 2024-02-28 ASSESSMENT — ANXIETY QUESTIONNAIRES
4. TROUBLE RELAXING: NEARLY EVERY DAY
1. FEELING NERVOUS, ANXIOUS, OR ON EDGE: MORE THAN HALF THE DAYS
5. BEING SO RESTLESS THAT IT IS HARD TO SIT STILL: NEARLY EVERY DAY
2. NOT BEING ABLE TO STOP OR CONTROL WORRYING: NEARLY EVERY DAY
6. BECOMING EASILY ANNOYED OR IRRITABLE: NEARLY EVERY DAY
3. WORRYING TOO MUCH ABOUT DIFFERENT THINGS: NEARLY EVERY DAY
7. FEELING AFRAID AS IF SOMETHING AWFUL MIGHT HAPPEN: NEARLY EVERY DAY
GAD7 TOTAL SCORE: 20

## 2024-02-28 ASSESSMENT — FIBROSIS 4 INDEX: FIB4 SCORE: 0.5

## 2024-02-28 NOTE — LETTER
February 28, 2024    To Whom It May Concern:         This is confirmation that Desire Mack attended her scheduled appointment with Yu Waldrop M.D. on 2/28/24.    Please excuse her absence from 2/14/24-2/21/24 as she was recovering at home from an acute illness.          If you have any questions please do not hesitate to call me at the phone number listed below.    Sincerely,          Yu Waldrop M.D.  [phone]

## 2024-02-29 NOTE — PROGRESS NOTES
Verbal consent was acquired by the patient to use [employer] ambient listening note generation during this visit Yes     Subjective:     CC:    Chief Complaint   Patient presents with    Establish Care       HISTORY OF THE PRESENT ILLNESS: Desire Mack is a 33 y.o. female. This pleasant patient is here today to establish primary care with me and discuss the following issues.   Her prior PCP was ASHLEY Adams; last seen 1/2/2024    Specialists   Gynecology    History of Present Illness  The patient presents today to reestablish primary care with me.    She has never previously been diagnosed with anxiety or depression.  Took some time away to deal with the stressors she had gone through more recently. She admits that she typically does not go to the doctor a lot or talk about her mental health a lot.     She called out of work from 02/14/2024 to 02/21/2024 because she was feeling sick. She was having diarrhea, vomiting, and hot flashes. It would stop and then come back. It would only be gone for no more than 1 to 1.5 hours. She was sweating, feeling nauseous, and throwing up stomach acid. 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  She is back at work, and is in need of a return to work note.       Depression Screening    Little interest or pleasure in doing things?  1 - several days   Feeling down, depressed , or hopeless? 2 - more than half the days   Trouble falling or staying asleep, or sleeping too much?  3 - nearly every day   Feeling tired or having little energy?  3 - nearly every day   Poor appetite or overeating?  3 - nearly every day   Feeling bad about yourself - or that you are a failure or have let yourself or your family down? 1 - several days   Trouble concentrating on things, such as reading the newspaper or watching television? 1 - several days   Moving or speaking so slowly that other people could have noticed.  Or the  opposite - being so fidgety or restless that you have been moving around a lot more than usual?  0 - not at all   Thoughts that you would be better off dead, or of hurting yourself?  0 - not at all   Patient Health Questionnaire Score: 14       If depressive symptoms identified deferred to follow up visit unless specifically addressed in assesment and plan.    Interpretation of PHQ-9 Total Score   Score Severity   1-4 No Depression   5-9 Mild Depression   10-14 Moderate Depression   15-19 Moderately Severe Depression   20-27 Severe Depression    PINO-7 Questionnaire    Feeling nervous, anxious, or on edge: More than half the days  Not being able to sop or control worrying: Nearly every day  Worrying too much about different things: Nearly every day  Trouble relaxing: Nearly every day  Being so restless that it's hard to sit still: Nearly every day  Becoming easily annoyed or irritable: Nearly every day  Feeling afraid as if something awful might happen: Nearly every day  Total: 20    Interpretation of PINO 7 Total Score   Score Severity :  0-4 No Anxiety   5-9 Mild Anxiety  10-14 Moderate Anxiety  15-21 Severe Anxiety      Allergies: Patient has no known allergies.      [facility] [place]  [address], [address]  [place] [address]  Phone: [phone] Fax: [phone]    [id number] - [place] - [address] [place] [address]  Phone: [phone] Fax: [phone]      Current Outpatient Medications   Medication Sig Dispense Refill    escitalopram (LEXAPRO) 10 MG Tab Take 1 Tablet by mouth every day. 30 Tablet 1     No current facility-administered medications for this visit.       History reviewed. No pertinent past medical history.    History reviewed. No pertinent surgical history.    Family History   Problem Relation Age of Onset    Diabetes Mother     No Known Problems Father     Cancer Maternal Aunt         mesothelioma    Diabetes Paternal Grandmother     No Known Problems Daughter   "      Social History     Tobacco Use    Smoking status: Former     Types: Cigarettes    Smokeless tobacco: Never    Tobacco comments:     Quit 2014; smoked x 10 years; 1 pack every 3 weeks   Vaping Use    Vaping Use: Some days    Substances: Nicotine, Flavoring    Devices: Pre-filled or refillable cartridge, Pre-filled pod   Substance Use Topics    Alcohol use: Yes     Comment: couple times a month; 4 shots    Drug use: Not Currently     Types: Marijuana     Comment: Previous marijuana use; last use in 2021. Denies illicit or IVDU         Objective:     /72 (BP Location: Left arm, Patient Position: Sitting, BP Cuff Size: Adult)   Pulse 99   Temp 36.5 °C (97.7 °F) (Temporal)   Resp 16   Ht 1.575 m (5' 2\")   Wt 68.4 kg (150 lb 14.4 oz)   SpO2 99%   BMI 27.60 kg/m²   Physical Exam  Constitutional: Alert, no distress  Skin: No rashes in visible areas  Eye: Conjunctiva clear, lids normal  Respiratory: Unlabored respiratory effort on room air, no cough, lungs clear to auscultation bilaterally  CV: RRR  MSK: Normal gait, moves all extremities  Psych: Alert and oriented x3, normal affect and mood      Assessment & Plan:   33 y.o. female with the following -    1. Anxiety and depression  - Chronic condition; new diagnosis for patient based on elevated screening questionnaires and history. Reviewed treatment options including medication and counseling/therapy.  - Cognitive behavioral therapy (CBT) is an effective treatment for PINO as are SSRIs.  SSRIs usually take 4-6 wks to titrate to have an effect   - Counseling for stress mgmt techniques - consult placed to Behavioral Health  - Informed the patient of [facility] and the resources that are available  - Will start Lexapro after discussion of side effects with patient (including GI and sexual SE) - will start at low dose 5mg with plan to increase to 10mg in one week if tolerated  - Pt to call/message in one week to report intolerance of meds and any changes in " mood /symptoms  - F/u with me in four weeks or sooner as needed  - ER for SI/SA/HI  - escitalopram (LEXAPRO) 10 MG Tab; Take 1 Tablet by mouth every day.  Dispense: 30 Tablet; Refill: 1  - Referral to Behavioral Health    2. Gastroenteritis  -Acute condition suspected diagnosis based on history.  Since resolved.  Work note provided for patient    3. Screening for cardiovascular condition  Orders as noted below for exclusionary, confirmatory, and risk stratification purposes:  - Lipid Profile; Future  - HEMOGLOBIN A1C; Future  - Encouraged heart healthy diet and exercise; resources and recommendations shared via secure messaging.      Return in about 4 weeks (around 3/27/2024) for anxiety follow up, GAD7, depression follow up, PHQ9, medication check.    Please note that this dictation was created using voice recognition software. I have made every reasonable attempt to correct obvious errors, but I expect that there are errors of grammar and possibly content that I did not discover before finalizing the note.

## 2024-03-20 ENCOUNTER — TELEMEDICINE (OUTPATIENT)
Dept: MEDICAL GROUP | Facility: MEDICAL CENTER | Age: 34
End: 2024-03-20
Attending: FAMILY MEDICINE
Payer: MEDICAID

## 2024-03-20 DIAGNOSIS — F41.9 ANXIETY AND DEPRESSION: ICD-10-CM

## 2024-03-20 DIAGNOSIS — R04.0 ACUTE ANTERIOR EPISTAXIS: ICD-10-CM

## 2024-03-20 DIAGNOSIS — F32.A ANXIETY AND DEPRESSION: ICD-10-CM

## 2024-03-20 PROCEDURE — 99214 OFFICE O/P EST MOD 30 MIN: CPT | Mod: 95 | Performed by: FAMILY MEDICINE

## 2024-03-20 ASSESSMENT — ANXIETY QUESTIONNAIRES
GAD7 TOTAL SCORE: 17
5. BEING SO RESTLESS THAT IT IS HARD TO SIT STILL: MORE THAN HALF THE DAYS
IF YOU CHECKED OFF ANY PROBLEMS ON THIS QUESTIONNAIRE, HOW DIFFICULT HAVE THESE PROBLEMS MADE IT FOR YOU TO DO YOUR WORK, TAKE CARE OF THINGS AT HOME, OR GET ALONG WITH OTHER PEOPLE: EXTREMELY DIFFICULT
2. NOT BEING ABLE TO STOP OR CONTROL WORRYING: MORE THAN HALF THE DAYS
4. TROUBLE RELAXING: NEARLY EVERY DAY
1. FEELING NERVOUS, ANXIOUS, OR ON EDGE: NEARLY EVERY DAY
3. WORRYING TOO MUCH ABOUT DIFFERENT THINGS: NEARLY EVERY DAY
7. FEELING AFRAID AS IF SOMETHING AWFUL MIGHT HAPPEN: SEVERAL DAYS
6. BECOMING EASILY ANNOYED OR IRRITABLE: NEARLY EVERY DAY

## 2024-03-20 NOTE — PROGRESS NOTES
Virtual Visit: Established Patient   This visit was conducted via Zoom using secure and encrypted videoconferencing technology.   The patient was in their home in the [facility].    The patient's identity was confirmed and verbal consent was obtained for this virtual visit.     Subjective:   CC:   Chief Complaint   Patient presents with    Allergic Reaction     Lexapro reaction 2 days ago, nose started bleeding, multple occassions       Desire Mack is a 33 y.o. female presenting for evaluation and management of:    Last visit patient had been started on Lexapro for severe anxiety.  However did not start taking medication until 4 days ago as she was concerned about potential side effects that had been she had with her from friends to told her not to take medication.  However she decided to take the medication after all and subsequently developed bloody noses.  She was concerned that his related to medication as was only difference that she could think of.  She is also been having headaches nausea and difficulty sleeping since starting the medication.    Current medicines (including changes today)  Current Outpatient Medications   Medication Sig Dispense Refill    escitalopram (LEXAPRO) 10 MG Tab Take 1 Tablet by mouth every day. 30 Tablet 1     No current facility-administered medications for this visit.       Patient Active Problem List    Diagnosis Date Noted    Grief 01/02/2024    PCB (post coital bleeding) 11/13/2023    Pelvic pain 11/13/2023    Dysmenorrhea 11/13/2023        Objective:   There were no vitals taken for this visit.    Physical Exam:  Constitutional: Alert, no distress, well-groomed.  Skin: No rashes in visible areas.  Eye: Round. Conjunctiva clear, lids normal. No icterus.   ENMT: Lips pink without lesions, good dentition, moist mucous membranes. Phonation normal.  Neck: No masses, no thyromegaly. Moves freely without pain.  Respiratory: Unlabored respiratory effort, no cough or audible  wheeze  Psych: Alert and oriented x3, normal affect and mood.     PINO-7 Questionnaire    Feeling nervous, anxious, or on edge: Nearly every day  Not being able to sop or control worrying: More than half the days  Worrying too much about different things: Nearly every day  Trouble relaxing: Nearly every day  Being so restless that it's hard to sit still: More than half the days  Becoming easily annoyed or irritable: Nearly every day  Feeling afraid as if something awful might happen: Several days  Total: 17    Interpretation of PINO 7 Total Score   Score Severity :  0-4 No Anxiety   5-9 Mild Anxiety  10-14 Moderate Anxiety  15-21 Severe Anxiety    Assessment and Plan:   The following treatment plan was discussed:     1. Anxiety and depression  -Chronic, still in severe range however slightly improved compared to prior screening.  She denies any symptoms of depression or SI/HI today.  Discussed side effects profile of medication in detail and reviewed strategies for better tolerability including starting with half tab and he is increasing to full tablet as tolerated and taking medication at night . Reassured patient that epistaxis is unlikely due to medication.  Discussed that more likely this is related to weather/temperature changes causing fragile blood vessels to bleed more easily as below.    2. Acute anterior epistaxis  - Acute; stable  History of likely anterior nose bleed.  Pathophysiology and anatomy of this condition was discussed with the patient.  Good nose care was reviewed to include nasal saline prn,   - Vaseline to nares before bed and avoidance of nose picking and nasal trauma.    - Technique for direct pressure was discussed and demonstrated for the patient.    How to stop a Nosebleed  1. Pinch the soft parts of the nose between your thumb and two fingers.  2. Hold it for 10 minutes without releasing (timed by a clock).  3. Keep head higher than the level of the heart, sit upright.  4. Positioned  forward, chin tucked to chest to avoid swallowing any blood  If Re-bleeding Occurs  Spray nose two times on both sides with decongestant spray (such as Afrin® or Zeferino-Synephrine®) and pinch nose and position head forward again.  To Prevent Re-bleeding After Bleeding Has Stopped  1. Do not pick, rub, or blow nose.  2. Gently spraying a saline solution in nose 2-3 times per day.  3. Applying size of a pea amount of lubricant (normal saline gel, A&D ointment,  Vaseline, antibiotic ointment), put on the end of your fingertip and apply it inside  the nose on the middle portion (the septum) to keep the skin lubricated.   When to call the Doctor or go to a Hospital Emergency Room if:  Bleeding cannot be stopped or keeps reoccurring.  Bleeding is rapid or if blood loss is large.  You feel weak or faint, presumably from blood loss    Follow-up: Return if symptoms worsen or fail to improve.

## 2024-04-17 ENCOUNTER — OFFICE VISIT (OUTPATIENT)
Dept: MEDICAL GROUP | Facility: MEDICAL CENTER | Age: 34
End: 2024-04-17
Attending: FAMILY MEDICINE
Payer: MEDICAID

## 2024-04-17 VITALS
BODY MASS INDEX: 27.05 KG/M2 | HEIGHT: 62 IN | TEMPERATURE: 97.9 F | DIASTOLIC BLOOD PRESSURE: 80 MMHG | OXYGEN SATURATION: 98 % | HEART RATE: 96 BPM | WEIGHT: 147 LBS | RESPIRATION RATE: 14 BRPM | SYSTOLIC BLOOD PRESSURE: 112 MMHG

## 2024-04-17 DIAGNOSIS — J02.9 SORE THROAT: ICD-10-CM

## 2024-04-17 DIAGNOSIS — L73.2 HIDRADENITIS SUPPURATIVA: ICD-10-CM

## 2024-04-17 LAB
FLUAV RNA SPEC QL NAA+PROBE: NEGATIVE
FLUBV RNA SPEC QL NAA+PROBE: NEGATIVE
RSV RNA SPEC QL NAA+PROBE: NEGATIVE
S PYO DNA SPEC NAA+PROBE: NOT DETECTED
SARS-COV-2 RNA RESP QL NAA+PROBE: NEGATIVE

## 2024-04-17 PROCEDURE — 3079F DIAST BP 80-89 MM HG: CPT | Performed by: FAMILY MEDICINE

## 2024-04-17 PROCEDURE — 0241U POCT CEPHEID COV-2, FLU A/B, RSV - PCR: CPT | Performed by: FAMILY MEDICINE

## 2024-04-17 PROCEDURE — 99213 OFFICE O/P EST LOW 20 MIN: CPT | Performed by: FAMILY MEDICINE

## 2024-04-17 PROCEDURE — 87651 STREP A DNA AMP PROBE: CPT | Performed by: FAMILY MEDICINE

## 2024-04-17 PROCEDURE — 3074F SYST BP LT 130 MM HG: CPT | Performed by: FAMILY MEDICINE

## 2024-04-17 PROCEDURE — 99214 OFFICE O/P EST MOD 30 MIN: CPT | Performed by: FAMILY MEDICINE

## 2024-04-17 RX ORDER — CLINDAMYCIN PHOSPHATE 11.9 MG/ML
SOLUTION TOPICAL
Qty: 60 ML | Refills: 2 | Status: SHIPPED | OUTPATIENT
Start: 2024-04-17

## 2024-04-17 ASSESSMENT — FIBROSIS 4 INDEX: FIB4 SCORE: 0.5

## 2024-04-17 NOTE — PATIENT INSTRUCTIONS
Hidradenitis Suppurativa  Hidradenitis suppurativa is a long-term (chronic) skin disease. It is similar to a severe form of acne, but it affects areas of the body where acne would be unusual, especially areas of the body where skin rubs against skin and becomes moist. These include:  Underarms.  Groin.  Genital area.  Buttocks.  Upper thighs.  Breasts.  Hidradenitis suppurativa may start out as small lumps or pimples caused by blocked skin pores, sweat glands, or hair follicles. Pimples may develop into deep sores that break open (rupture) and drain pus. Over time, affected areas of skin may thicken and become scarred. This condition is rare and does not spread from person to person (non-contagious).  What are the causes?  The exact cause of this condition is not known. It may be related to:  Male and female hormones.  An overactive disease-fighting system (immune system). The immune system may over-react to blocked hair follicles or sweat glands and cause swelling and pus-filled sores.  What increases the risk?  You are more likely to develop this condition if you:  Are female.  Are 11-55 years old.  Have a family history of hidradenitis suppurativa.  Have a personal history of acne.  Are overweight.  Smoke.  Take the medicine lithium.  What are the signs or symptoms?  The first symptoms are usually painful bumps in the skin, similar to pimples. The condition may get worse over time (progress), or it may only cause mild symptoms. If the disease progresses, symptoms may include:  Skin bumps getting bigger and growing deeper into the skin.  Bumps rupturing and draining pus.  Itchy, infected skin.  Skin getting thicker and scarred.  Tunnels under the skin (fistulas) where pus drains from a bump.  Pain during daily activities, such as pain during walking if your groin area is affected.  Emotional problems, such as stress or depression. This condition may affect your appearance and your ability or willingness to wear  certain clothes or do certain activities.  How is this diagnosed?  This condition is diagnosed by a health care provider who specializes in skin conditions (dermatologist). You may be diagnosed based on:  Your symptoms and medical history.  A physical exam.  Testing a pus sample for infection.  Blood tests.  How is this treated?  Your treatment will depend on how severe your symptoms are. The same treatment will not work for everybody with this condition. You may need to try several treatments to find what works best for you. Treatment may include:  Cleaning and bandaging (dressing) your wounds as needed.  Lifestyle changes, such as new skin care routines.  Taking medicines, such as:  Antibiotics.  Acne medicines.  Medicines to reduce the activity of the immune system.  A diabetes medicine (metformin).  Birth control pills, for women.  Steroids to reduce swelling and pain.  Working with a mental health care provider, if you experience emotional distress due to this condition.  If you have severe symptoms that do not get better with medicine, you may need surgery. Surgery may involve:  Using a laser to clear the skin and remove hair follicles.  Opening and draining deep sores.  Removing the areas of skin that are diseased and scarred.  Follow these instructions at home:  Medicines    Take over-the-counter and prescription medicines only as told by your health care provider.  If you were prescribed antibiotics, take them as told by your health care provider. Do not stop using the antibiotic even if your condition improves.  Skin care  If you have open wounds, cover them with a clean dressing as told by your health care provider. Keep wounds clean by washing them gently with soap and water when you bathe.  Do not shave the areas where you get hidradenitis suppurativa.  Wear loose-fitting clothes.  Try to avoid getting overheated or sweaty. If you get sweaty or wet, change into clean, dry clothes as soon as you can.  To  help relieve pain and itchiness, cover sore areas with a warm, clean washcloth (warm compress) for 5-10 minutes as often as needed.  Your healthcare provider may recommend an antiperspirant deodorant that may be gentle on your skin.  A daily antiseptic wash to cleanse affected areas may be suggested by your healthcare provider.  General instructions  Learn as much as you can about your disease so that you have an active role in your treatment. Work closely with your health care provider to find treatments that work for you.  If you are overweight, work with your health care provider to lose weight as recommended.  Do not use any products that contain nicotine or tobacco. These products include cigarettes, chewing tobacco, and vaping devices, such as e-cigarettes. If you need help quitting, ask your health care provider.  If you struggle with living with this condition, talk with your health care provider or work with a mental health care provider as recommended.  Keep all follow-up visits.  Where to find more information  Hidradenitis Suppurativa Foundation[employer] Inc.: www.hs-foundation.org  American Academy of Dermatology: www.aad.org  Contact a health care provider if:  You have a flare-up of hidradenitis suppurativa.  You have a fever or chills.  You have trouble controlling your symptoms at home.  You have trouble doing your daily activities because of your symptoms.  You have trouble dealing with emotional problems related to your condition.  Summary  Hidradenitis suppurativa is a long-term (chronic) skin disease. It is similar to a severe form of acne, but it affects areas of the body where acne would be unusual.  The first symptoms are usually painful bumps in the skin, similar to pimples. The condition may only cause mild symptoms, or it may get worse over time (progress).  If you have open wounds, cover them with a clean dressing as told by your health care provider. Keep wounds clean by washing them gently with  soap and water when you bathe.  Besides skin care, treatment may include medicines, laser treatment, and surgery.  This information is not intended to replace advice given to you by your health care provider. Make sure you discuss any questions you have with your health care provider.  Document Revised: 02/08/2023 Document Reviewed: 02/08/2023  Elsevier Patient Education © 2023 Else[employer] Inc.

## 2024-04-17 NOTE — PROGRESS NOTES
"Verbal consent was acquired by the patient to use [employer] ambient listening note generation during this visit   Subjective:     HPI:   History of Present Illness  The patient presents for evaluation of multiple medical concerns.    The patient began experiencing an itchy throat and dry mouth yesterday, which escalated to a severe sore throat upon awakening this morning. She experienced chest tightness after extensive walking at work, which has since resolved. However, she experiences breathlessness during rapid movements. She denies any wheezing but reports a mild cough this morning. She has a past medical history of asthma in her youth, which was managed with an inhaler. She denies any symptoms of rhinorrhea, otalgia, headaches, nausea, or vomiting. She has not been in contact with sick individuals at her workplace throughout the week. She has no history of streptococcal pharyngitis or other viral infections. She contracted COVID-19 approximately 3 to 4 years ago at the onset of her symptoms.    The patient has developed intermittent bumps in her bilateral axillary region for several years. They occasionally rupture and produce purulent discharge. Occasionally, the bumps are present in her groin and buttocks, but not under her breasts. She suspects the presence of hidradenitis suppurativa. She is not currently on any medications and is not planning to become pregnant. She was previously on Lexapro, which she discontinued approximately 1 to 2 weeks ago.      Objective:     Exam:  /80 (BP Location: Left arm, Patient Position: Sitting)   Pulse 96   Temp 36.6 °C (97.9 °F) (Temporal)   Resp 14   Ht 1.575 m (5' 2\")   Wt 66.7 kg (147 lb)   SpO2 98%   BMI 26.89 kg/m²  Body mass index is 26.89 kg/m².    Physical Exam    General: Normal appearing. No distress.  ENT: Ears normal shape and contour, canals are clear bilaterally, tympanic membranes are benign, oropharynx is without erythema, edema or exudates. "   Neck: Supple. Thyroid is not enlarged. Tender lymphadenopathy of posterior cervical chain  Pulmonary: Clear to ausculation.  Normal effort. No rales, ronchi, or wheezing.  Cardiovascular: Regular rate and rhythm without murmur. Radial pulses are intact and equal bilaterally.  Skin: inflammatory nodules and sinus tracts of b/l axillae    Assessment & Plan:     1. Sore throat  POCT CoV-2, Flu A/B, RSV by PCR    POCT CEPHEID GROUP A STREP - PCR      2. Hidradenitis suppurativa  Referral to Dermatology    clindamycin (CLEOCIN) 1 % Solution          Assessment & Plan  1. Pharyngitis.  A throat swab will be conducted today. In the event of a positive strep test, an antibiotic regimen will be initiated. In the event of influenza or COVID-19, antiviral therapy will be initiated. If influenza is confirmed, Tamiflu will be prescribed. For the sore throat, over-the-counter analgesics such as Tylenol or ibuprofen are recommended. In the event of a severe cough, Tessalon Perles or benzonatate may be considered. Over-the-counter Mucinex may also be beneficial. If all tests return negative results, supportive care will be maintained. If the cough intensifies, the patient is advised to communicate with us via [employer], at which point medication will be prescribed.    2. Hidradenitis suppurativa.  The patient has been advised to use chlorhexidine antispetic and topical clindamyacin. A referral to dermatology has been made.           No follow-ups on file.      Please note that this dictation was created using voice recognition software. I have made every reasonable attempt to correct obvious errors, but I expect that there are errors of grammar and possibly content that I did not discover before finalizing the note.

## 2024-04-21 ENCOUNTER — HOSPITAL ENCOUNTER (EMERGENCY)
Facility: MEDICAL CENTER | Age: 34
End: 2024-04-21
Attending: EMERGENCY MEDICINE
Payer: MEDICAID

## 2024-04-21 VITALS
DIASTOLIC BLOOD PRESSURE: 71 MMHG | SYSTOLIC BLOOD PRESSURE: 123 MMHG | HEIGHT: 62 IN | BODY MASS INDEX: 28.2 KG/M2 | TEMPERATURE: 97.6 F | OXYGEN SATURATION: 97 % | HEART RATE: 96 BPM | RESPIRATION RATE: 16 BRPM | WEIGHT: 153.22 LBS

## 2024-04-21 DIAGNOSIS — L02.91 ABSCESS: ICD-10-CM

## 2024-04-21 DIAGNOSIS — U07.1 COVID: ICD-10-CM

## 2024-04-21 LAB
FLUAV RNA SPEC QL NAA+PROBE: NEGATIVE
FLUBV RNA SPEC QL NAA+PROBE: NEGATIVE
RSV RNA SPEC QL NAA+PROBE: NEGATIVE
SARS-COV-2 RNA RESP QL NAA+PROBE: DETECTED

## 2024-04-21 PROCEDURE — 303977 HCHG I & D

## 2024-04-21 PROCEDURE — 304217 HCHG IRRIGATION SYSTEM

## 2024-04-21 PROCEDURE — 99283 EMERGENCY DEPT VISIT LOW MDM: CPT

## 2024-04-21 PROCEDURE — 700101 HCHG RX REV CODE 250: Mod: UD | Performed by: EMERGENCY MEDICINE

## 2024-04-21 PROCEDURE — 0241U HCHG SARS-COV-2 COVID-19 NFCT DS RESP RNA 4 TRGT ED POC: CPT

## 2024-04-21 RX ORDER — LIDOCAINE HYDROCHLORIDE AND EPINEPHRINE 10; 10 MG/ML; UG/ML
10 INJECTION, SOLUTION INFILTRATION; PERINEURAL ONCE
Status: COMPLETED | OUTPATIENT
Start: 2024-04-21 | End: 2024-04-21

## 2024-04-21 RX ADMIN — LIDOCAINE HYDROCHLORIDE AND EPINEPHRINE 10 ML: 10; 10 INJECTION, SOLUTION INFILTRATION; PERINEURAL at 21:15

## 2024-04-21 ASSESSMENT — FIBROSIS 4 INDEX: FIB4 SCORE: 0.5

## 2024-04-22 NOTE — ED NOTES
Patient discharged home per ERP.  Discharge teaching and education discussed with patient. POC discussed.   Patient verbalized understanding of discharge teaching and education. No other questions at this time.     Gauze dressing placed to L axilla + supplies for home. Work note provided re: COVID.    VSS. Patient alert and oriented. Patient arranged ride for self. Able to ambulate off unit safely with steady gait.

## 2024-04-22 NOTE — ED TRIAGE NOTES
"Chief Complaint   Patient presents with    Flu Like Symptoms     Starting Wednesday night.  Pt tested for Covid on Thursday, thinks it may have been too soon.    Abscess     Abscess in the L armpit x2 weeks.     POC Covid test performed in triage.     Pt is alert and oriented, speaking in full sentences, follows commands and responds appropriately to questions. Resperations are even and unlabored.      Pt placed in lobby. Pt educated on triage process. Pt encouraged to alert staff for any changes.     Patient and staff wearing appropriate PPE.    /78   Pulse 97   Temp 36.1 °C (96.9 °F) (Temporal)   Resp 19   Ht 1.575 m (5' 2.01\")   Wt 69.5 kg (153 lb 3.5 oz)   SpO2 100%      "

## 2024-04-22 NOTE — ED PROVIDER NOTES
ER Provider Note    Scribed for Dr. Ethan Azul M.D. by Mamadou Lloyd. 4/21/2024  8:44 PM    Primary Care Provider: Yu Waldrop M.D.    CHIEF COMPLAINT  Chief Complaint   Patient presents with    Flu Like Symptoms     Starting Wednesday night.  Pt tested for Covid on Thursday, thinks it may have been too soon.    Abscess     Abscess in the L armpit x2 weeks.       EXTERNAL RECORDS REVIEWED  Outpatient Notes Patient was seen for Hidradenitis suppurativa 04/17/24    HPI/ROS    Desire Mack is a 33 y.o. female with a history of hidradenitis suppurativa who presents to the ED for left armpit abscess onset two weeks. She states the area is also very sore. Patient states her abscess normally resolve on their own and that is why she did not prompt until now for this concern as it has not resolved this time. Patient reports also experiencing cough, sore throat, rhinorrhea, and congestion onset 3 days. She states she tested for COVID 3 days ago and it was negative but she believes she may have tested herself too early. Patient reports her symptoms have been improving and is drinking lots of fluids daily. No alleviating or exacerbating factors noted. Patient has no known allergies.    PAST MEDICAL HISTORY  hidradenitis suppurativa    SURGICAL HISTORY  History reviewed. No pertinent surgical history.    FAMILY HISTORY  Family History   Problem Relation Age of Onset    Diabetes Mother     No Known Problems Father     Cancer Maternal Aunt         mesothelioma    Diabetes Paternal Grandmother     No Known Problems Daughter        SOCIAL HISTORY   reports that she has quit smoking. Her smoking use included cigarettes. She has never used smokeless tobacco. She reports current alcohol use. She reports that she does not currently use drugs after having used the following drugs: Marijuana.    CURRENT MEDICATIONS  Previous Medications    CLINDAMYCIN (CLEOCIN) 1 % SOLUTION    Apply fingertip amount to affected areas  "twice daily    ESCITALOPRAM (LEXAPRO) 10 MG TAB    Take 1 Tablet by mouth every day.       ALLERGIES  Patient has no known allergies.    PHYSICAL EXAM  /78   Pulse 97   Temp 36.1 °C (96.9 °F) (Temporal)   Resp 19   Ht 1.575 m (5' 2.01\")   Wt 69.5 kg (153 lb 3.5 oz)   LMP 04/21/2024   SpO2 100%   BMI 28.02 kg/m²   Constitutional: Alert in no apparent distress.  HENT: No signs of trauma, Bilateral external ears normal, Nose normal.   Eyes: Pupils are equal and reactive, Conjunctiva normal, Non-icteric.   Neck: Normal range of motion, No tenderness, Supple,   Cardiovascular: Regular rate and rhythm, no murmurs.   Thorax & Lungs: Normal breath sounds, No respiratory distress, No wheezing, No chest tenderness.   Abdomen: Bowel sounds normal, Soft, No tenderness,   Skin: Warm, Dry, left axilla has 2 papules that are 3 mm in diameter and 1 fluctuant mass that is 7 mm by 2.5 cm.   Back: No bony tenderness, No CVA tenderness.       DIAGNOSTIC STUDIES & PROCEDURES    Labs:   Labs Reviewed   POC COV-2, FLU A/B, RSV BY PCR - Abnormal; Notable for the following components:       Result Value    POC SARS-CoV-2, PCR DETECTED (*)     All other components within normal limits   POCT COV-2, FLU A/B, RSV BY PCR   All labs reviewed by me.      Incision and Drainage Procedure Note    Indication: axillary abscess    Procedure: The patient was positioned appropriately and the skin over the incision site was prepped with betadine. Local anesthesia was obtained by infiltration using 2.0 cc of 1% Lidocaine with epinephrine.  An incision was then made over the greatest area of fluctuance and approximately 1 cc of purulent material was expressed. Loculations were not present. The drainage cavity was then irrigated. The patient’s tetanus status was up to date and did not require a booster dose.    The patient tolerated the procedure well.    Complications: None    COURSE & MEDICAL DECISION MAKING    ED Observation Status? No; " Patient does not meet criteria for ED Observation.     INITIAL ASSESSMENT AND PLAN  Care Narrative:        8:44 PM - Patient seen and evaluated at bedside. Discussed plan of care, including labs to evaluate. I also informed the patient I will drain the abscess. Patient agrees to plan of care. Ordered SARS-CoV-2, Flu A/B, and RSV to evaluate.    9:21 PM - Patient was reevaluated at bedside. Discussed lab results with the patient and informed them of the positive COVID result. Incision and drainage procedure performed by me as outlined above. Patient had the opportunity to ask any questions. The plan for discharge was discussed with them and they were told to return for any new or worsening symptoms. She was also informed of the plans for follow up. Patient is understanding and agreeable to the plan for discharge.    ADDITIONAL PROBLEM LIST AND DISPOSITION  Patient with viral syndrome.  Not septic.  Ultimately has COVID.  Does have abscess.  Already using clindamycin gel.  No need for further antibiotics.  Abscess did have drainage.  Will discharge home with strict return precautions and follow-up.               DISPOSITION AND DISCUSSIONS  I have discussed management of the patient with the following physicians and EBENEZER's: None    Discussion of management with other QHP or appropriate source(s): None     Escalation of care considered, and ultimately not performed: acute inpatient care management, however at this time, the patient is most appropriate for outpatient management.    Barriers to care at this time, including but not limited to:  None .     Decision tools and prescription drugs considered including, but not limited to: Antibiotics topical .    The patient will return for new or worsening symptoms and is stable at the time of discharge.    The patient is referred to a primary physician for blood pressure management, diabetic screening, and for all other preventative health concerns.    DISPOSITION:  Patient  will be discharged home in stable condition.    FOLLOW UP:  Yu Waldrop M.D.  [address] [id number] [phone]    In 2 days      FINAL IMPRESSION   1. Abscess    2. COVID      Mamadou GRAY (Scribe), am scribing for, and in the presence of, Ethan Azul M.D..    Electronically signed by: Mamadou Lloyd (Scribe), 4/21/2024    Ethan GRAY M.D. personally performed the services described in this documentation, as scribed by Mamadou Lloyd in my presence, and it is both accurate and complete.    The note accurately reflects work and decisions made by me.  Ethan Azul M.D.  4/22/2024  2:22 AM

## 2024-04-26 ENCOUNTER — OFFICE VISIT (OUTPATIENT)
Dept: MEDICAL GROUP | Facility: MEDICAL CENTER | Age: 34
End: 2024-04-26
Attending: FAMILY MEDICINE
Payer: MEDICAID

## 2024-04-26 VITALS
TEMPERATURE: 97.6 F | DIASTOLIC BLOOD PRESSURE: 70 MMHG | HEART RATE: 100 BPM | HEIGHT: 62 IN | SYSTOLIC BLOOD PRESSURE: 100 MMHG | BODY MASS INDEX: 27.23 KG/M2 | WEIGHT: 148 LBS | RESPIRATION RATE: 14 BRPM | OXYGEN SATURATION: 97 %

## 2024-04-26 DIAGNOSIS — F41.9 ANXIETY: ICD-10-CM

## 2024-04-26 PROCEDURE — 3074F SYST BP LT 130 MM HG: CPT | Performed by: FAMILY MEDICINE

## 2024-04-26 PROCEDURE — 99214 OFFICE O/P EST MOD 30 MIN: CPT | Performed by: FAMILY MEDICINE

## 2024-04-26 PROCEDURE — 3078F DIAST BP <80 MM HG: CPT | Performed by: FAMILY MEDICINE

## 2024-04-26 PROCEDURE — 99213 OFFICE O/P EST LOW 20 MIN: CPT | Performed by: FAMILY MEDICINE

## 2024-04-26 RX ORDER — HYDROXYZINE HYDROCHLORIDE 25 MG/1
12.5-5 TABLET, FILM COATED ORAL 3 TIMES DAILY PRN
Qty: 60 TABLET | Refills: 1 | Status: SHIPPED | OUTPATIENT
Start: 2024-04-26

## 2024-04-26 RX ORDER — PROPRANOLOL HYDROCHLORIDE 10 MG/1
10 TABLET ORAL 3 TIMES DAILY PRN
Qty: 90 TABLET | Refills: 1 | Status: SHIPPED | OUTPATIENT
Start: 2024-04-26

## 2024-04-26 ASSESSMENT — FIBROSIS 4 INDEX: FIB4 SCORE: 0.5

## 2024-04-27 NOTE — ASSESSMENT & PLAN NOTE
Patient reports that she has been having some anxiety at work and is hoping to get FMLA days for this. She is requesting paperwork to be completed.  She was placed on lexapro 10mg but has some side effects of decreased appetite. Tried for 2.5 weeks.   She notes a lot of changes going on at work that are exacerbating things at work and they are asking for more things of her   She notes the anxiety is exacerbating losing focus and forgetting things. She finds herself crying at work. She has felt like she needs to sit in the bathroom to pull herself together. She is looking for new employment. She is worried she will lose her job. The unknown has been giving her significant anxiety.   She does not have a therapist.

## 2024-04-29 NOTE — PROGRESS NOTES
Subjective:     CC:   Chief Complaint   Patient presents with    Paperwork         HPI:     Anxiety  Patient reports that she has been having some anxiety at work and is hoping to get FMLA days for this. She is requesting paperwork to be completed.  She was placed on lexapro 10mg but has some side effects of decreased appetite. Tried for 2.5 weeks.   She notes a lot of changes going on at work that are exacerbating things at work and they are asking for more things of her   She notes the anxiety is exacerbating losing focus and forgetting things. She finds herself crying at work. She has felt like she needs to sit in the bathroom to pull herself together. She is looking for new employment. She is worried she will lose her job. The unknown has been giving her significant anxiety.   She does not have a therapist.       History reviewed. No pertinent past medical history.    Social History     Tobacco Use    Smoking status: Former     Types: Cigarettes    Smokeless tobacco: Never    Tobacco comments:     Quit 2014; smoked x 10 years; 1 pack every 3 weeks   Vaping Use    Vaping Use: Some days    Substances: Nicotine, Flavoring    Devices: Pre-filled or refillable cartridge, Pre-filled pod   Substance Use Topics    Alcohol use: Yes     Comment: couple times a month; 4 shots    Drug use: Not Currently     Types: Marijuana     Comment: Previous marijuana use; last use in 2021. Denies illicit or IVDU       Current Outpatient Medications Ordered in Epic   Medication Sig Dispense Refill    propranolol (INDERAL) 10 MG Tab Take 1 Tablet by mouth 3 times a day as needed (anxiety). 90 Tablet 1    hydrOXYzine HCl (ATARAX) 25 MG Tab Take 0.5-2 Tablets by mouth 3 times a day as needed for Anxiety (sleep). 60 Tablet 1    sertraline (ZOLOFT) 50 MG Tab Take 1 Tablet by mouth every day. 30 Tablet 2    clindamycin (CLEOCIN) 1 % Solution Apply fingertip amount to affected areas twice daily 60 mL 2     No current Epic-ordered  "facility-administered medications on file.       Allergies:  Patient has no known allergies.        Objective:       Exam:  /70 (BP Location: Left arm, Patient Position: Sitting)   Pulse 100   Temp 36.4 °C (97.6 °F) (Temporal)   Resp 14   Ht 1.575 m (5' 2\")   Wt 67.1 kg (148 lb)   LMP 04/21/2024   SpO2 97%   BMI 27.07 kg/m²  Body mass index is 27.07 kg/m².    Constitutional: Alert, no distress, well-groomed.  Respiratory: Unlabored respiratory effort, no cough.  MSK: moves all extremities.  Psych: normal affect and mood.          Data reviewed:   Reviewed ER visit    Assessment & Plan:     33 y.o. female with the following -     1. Anxiety  - propranolol (INDERAL) 10 MG Tab; Take 1 Tablet by mouth 3 times a day as needed (anxiety).  Dispense: 90 Tablet; Refill: 1  - hydrOXYzine HCl (ATARAX) 25 MG Tab; Take 0.5-2 Tablets by mouth 3 times a day as needed for Anxiety (sleep).  Dispense: 60 Tablet; Refill: 1  - sertraline (ZOLOFT) 50 MG Tab; Take 1 Tablet by mouth every day.  Dispense: 30 Tablet; Refill: 2  FMLA paperwork completed today  Would like a change in medications and also trial of new as needed meds  Lexapro > zoloft  Propranolol during the day PRN  Hydroxyzine prn at night for difficulty sleeping  F/u with PCP     Return in about 1 month (around 5/26/2024).    Please note that this dictation was created using voice recognition software. I have made every reasonable attempt to correct obvious errors, but I expect that there are errors of grammar and possibly content that I did not discover before finalizing the note.        "

## 2024-07-22 ENCOUNTER — OFFICE VISIT (OUTPATIENT)
Dept: MEDICAL GROUP | Facility: MEDICAL CENTER | Age: 34
End: 2024-07-22
Attending: FAMILY MEDICINE
Payer: MEDICAID

## 2024-07-22 VITALS
WEIGHT: 150.6 LBS | HEIGHT: 62 IN | OXYGEN SATURATION: 100 % | RESPIRATION RATE: 16 BRPM | BODY MASS INDEX: 27.71 KG/M2 | TEMPERATURE: 97.5 F | SYSTOLIC BLOOD PRESSURE: 118 MMHG | DIASTOLIC BLOOD PRESSURE: 66 MMHG | HEART RATE: 108 BPM

## 2024-07-22 DIAGNOSIS — F41.9 ANXIETY: ICD-10-CM

## 2024-07-22 DIAGNOSIS — Z02.89 ENCOUNTER FOR COMPLETION OF FORM WITH PATIENT: ICD-10-CM

## 2024-07-22 PROCEDURE — 99213 OFFICE O/P EST LOW 20 MIN: CPT | Performed by: FAMILY MEDICINE

## 2024-07-22 ASSESSMENT — ANXIETY QUESTIONNAIRES
2. NOT BEING ABLE TO STOP OR CONTROL WORRYING: SEVERAL DAYS
1. FEELING NERVOUS, ANXIOUS, OR ON EDGE: SEVERAL DAYS
GAD7 TOTAL SCORE: 5
7. FEELING AFRAID AS IF SOMETHING AWFUL MIGHT HAPPEN: NOT AT ALL
4. TROUBLE RELAXING: NOT AT ALL
5. BEING SO RESTLESS THAT IT IS HARD TO SIT STILL: NOT AT ALL
3. WORRYING TOO MUCH ABOUT DIFFERENT THINGS: SEVERAL DAYS
IF YOU CHECKED OFF ANY PROBLEMS ON THIS QUESTIONNAIRE, HOW DIFFICULT HAVE THESE PROBLEMS MADE IT FOR YOU TO DO YOUR WORK, TAKE CARE OF THINGS AT HOME, OR GET ALONG WITH OTHER PEOPLE: SOMEWHAT DIFFICULT
6. BECOMING EASILY ANNOYED OR IRRITABLE: MORE THAN HALF THE DAYS

## 2024-07-22 ASSESSMENT — FIBROSIS 4 INDEX: FIB4 SCORE: 0.5

## 2024-08-02 ENCOUNTER — APPOINTMENT (OUTPATIENT)
Dept: URGENT CARE | Facility: CLINIC | Age: 34
End: 2024-08-02
Payer: MEDICAID

## 2024-08-03 ENCOUNTER — APPOINTMENT (OUTPATIENT)
Dept: URGENT CARE | Facility: CLINIC | Age: 34
End: 2024-08-03
Payer: MEDICAID

## 2024-08-04 ENCOUNTER — OFFICE VISIT (OUTPATIENT)
Dept: URGENT CARE | Facility: CLINIC | Age: 34
End: 2024-08-04
Payer: MEDICAID

## 2024-08-04 VITALS
OXYGEN SATURATION: 98 % | RESPIRATION RATE: 16 BRPM | WEIGHT: 146.7 LBS | BODY MASS INDEX: 27 KG/M2 | TEMPERATURE: 97.7 F | DIASTOLIC BLOOD PRESSURE: 72 MMHG | HEIGHT: 62 IN | SYSTOLIC BLOOD PRESSURE: 116 MMHG | HEART RATE: 98 BPM

## 2024-08-04 DIAGNOSIS — B34.9 NONSPECIFIC SYNDROME SUGGESTIVE OF VIRAL ILLNESS: ICD-10-CM

## 2024-08-04 PROCEDURE — 3074F SYST BP LT 130 MM HG: CPT | Performed by: PHYSICIAN ASSISTANT

## 2024-08-04 PROCEDURE — 3078F DIAST BP <80 MM HG: CPT | Performed by: PHYSICIAN ASSISTANT

## 2024-08-04 PROCEDURE — 99213 OFFICE O/P EST LOW 20 MIN: CPT | Performed by: PHYSICIAN ASSISTANT

## 2024-08-04 ASSESSMENT — ENCOUNTER SYMPTOMS
FEVER: 0
CHILLS: 0
VOMITING: 0
NAUSEA: 0
COUGH: 1

## 2024-08-04 ASSESSMENT — FIBROSIS 4 INDEX: FIB4 SCORE: 0.5

## 2024-08-04 NOTE — LETTER
[facility] [place] [id number] [facility] [address]     August 4, 2024    Patient: Desire Mack   YOB: 1990   Date of Visit: 8/4/2024       To Whom It May Concern:    Desire Mack was seen and treated in our department on 8/4/2024. Please excuse her from work on 8/1-8/2/24.    Sincerely,     Nikko Conway P.A.-C.

## 2024-08-05 NOTE — PROGRESS NOTES
"Subjective:   Desire Mack is a 33 y.o. female who presents for Eye Problem (Had possible pink eye was out of work on Thursday and Friday had sick symptoms and now only a small cough but is needing a dr note for days out of work )        Patient states that she had sore throat, congestion, pinkeye and a cough that began midweek and persisted through the end of last week.  States that her symptoms have virtually resolved.  She only has a mild cough.  No fevers no chills no nausea or vomiting.  She took an at home COVID test and this was negative.  Patient requests a work note as she missed work last Thursday and Friday.       Review of Systems   Constitutional:  Negative for chills and fever.   Respiratory:  Positive for cough.    Gastrointestinal:  Negative for nausea and vomiting.       PMH:  has no past medical history on file.  MEDS:   Current Outpatient Medications:     propranolol (INDERAL) 10 MG Tab, Take 1 Tablet by mouth 3 times a day as needed (anxiety)., Disp: 90 Tablet, Rfl: 1    hydrOXYzine HCl (ATARAX) 25 MG Tab, Take 0.5-2 Tablets by mouth 3 times a day as needed for Anxiety (sleep)., Disp: 60 Tablet, Rfl: 1    sertraline (ZOLOFT) 50 MG Tab, Take 1 Tablet by mouth every day., Disp: 30 Tablet, Rfl: 2    clindamycin (CLEOCIN) 1 % Solution, Apply fingertip amount to affected areas twice daily, Disp: 60 mL, Rfl: 2  ALLERGIES: No Known Allergies  SURGHX: No past surgical history on file.  SOCHX:  reports that she has quit smoking. Her smoking use included cigarettes. She has never used smokeless tobacco. She reports current alcohol use. She reports that she does not currently use drugs after having used the following drugs: Marijuana.  FH: Family history was reviewed, no pertinent findings to report   Objective:   /72 (BP Location: Right arm, Patient Position: Sitting)   Pulse 98   Temp 36.5 °C (97.7 °F) (Temporal)   Resp 16   Ht 1.575 m (5' 2\")   Wt 66.5 kg (146 lb 11.2 oz)   SpO2 98%   BMI " 26.83 kg/m²   Physical Exam  Vitals reviewed.   Constitutional:       General: She is not in acute distress.     Appearance: Normal appearance. She is well-developed. She is not toxic-appearing.   HENT:      Head: Normocephalic and atraumatic.      Right Ear: External ear normal.      Left Ear: External ear normal.      Nose: Nose normal.      Mouth/Throat:      Lips: Pink.      Mouth: Mucous membranes are moist.      Pharynx: Oropharynx is clear. Uvula midline.   Cardiovascular:      Rate and Rhythm: Normal rate and regular rhythm.      Heart sounds: Normal heart sounds, S1 normal and S2 normal.   Pulmonary:      Effort: Pulmonary effort is normal. No respiratory distress.      Breath sounds: Normal breath sounds. No stridor. No decreased breath sounds, wheezing, rhonchi or rales.   Skin:     General: Skin is dry.   Neurological:      Comments: Alert and oriented.    Psychiatric:         Speech: Speech normal.         Behavior: Behavior normal.           Assessment/Plan:   1. Nonspecific syndrome suggestive of viral illness    Work note provided.  No additional treatment indicated.

## 2024-12-27 ENCOUNTER — GYNECOLOGY VISIT (OUTPATIENT)
Dept: OBGYN | Facility: CLINIC | Age: 34
End: 2024-12-27
Payer: MEDICAID

## 2024-12-27 ENCOUNTER — HOSPITAL ENCOUNTER (OUTPATIENT)
Facility: MEDICAL CENTER | Age: 34
End: 2024-12-27
Attending: NURSE PRACTITIONER
Payer: MEDICAID

## 2024-12-27 ENCOUNTER — HOSPITAL ENCOUNTER (OUTPATIENT)
Dept: LAB | Facility: MEDICAL CENTER | Age: 34
End: 2024-12-27
Attending: NURSE PRACTITIONER
Payer: MEDICAID

## 2024-12-27 VITALS
DIASTOLIC BLOOD PRESSURE: 70 MMHG | BODY MASS INDEX: 27.6 KG/M2 | SYSTOLIC BLOOD PRESSURE: 100 MMHG | WEIGHT: 150 LBS | HEIGHT: 62 IN

## 2024-12-27 DIAGNOSIS — Z01.419 ENCOUNTER FOR ANNUAL ROUTINE GYNECOLOGICAL EXAMINATION: ICD-10-CM

## 2024-12-27 DIAGNOSIS — Z20.2 POSSIBLE EXPOSURE TO STI: ICD-10-CM

## 2024-12-27 DIAGNOSIS — Z12.4 CERVICAL CANCER SCREENING: ICD-10-CM

## 2024-12-27 LAB
CANDIDA DNA VAG QL PROBE+SIG AMP: POSITIVE
G VAGINALIS DNA VAG QL PROBE+SIG AMP: NEGATIVE
HBV SURFACE AG SER QL: NORMAL
HCV AB SER QL: NORMAL
HIV 1+2 AB+HIV1 P24 AG SERPL QL IA: NORMAL
T VAGINALIS DNA VAG QL PROBE+SIG AMP: NEGATIVE

## 2024-12-27 PROCEDURE — 87480 CANDIDA DNA DIR PROBE: CPT

## 2024-12-27 PROCEDURE — 87591 N.GONORRHOEAE DNA AMP PROB: CPT

## 2024-12-27 PROCEDURE — 3074F SYST BP LT 130 MM HG: CPT | Performed by: NURSE PRACTITIONER

## 2024-12-27 PROCEDURE — 86780 TREPONEMA PALLIDUM: CPT

## 2024-12-27 PROCEDURE — 87389 HIV-1 AG W/HIV-1&-2 AB AG IA: CPT

## 2024-12-27 PROCEDURE — 36415 COLL VENOUS BLD VENIPUNCTURE: CPT

## 2024-12-27 PROCEDURE — 99385 PREV VISIT NEW AGE 18-39: CPT | Performed by: NURSE PRACTITIONER

## 2024-12-27 PROCEDURE — 3078F DIAST BP <80 MM HG: CPT | Performed by: NURSE PRACTITIONER

## 2024-12-27 PROCEDURE — 86803 HEPATITIS C AB TEST: CPT

## 2024-12-27 PROCEDURE — 87510 GARDNER VAG DNA DIR PROBE: CPT

## 2024-12-27 PROCEDURE — 87491 CHLMYD TRACH DNA AMP PROBE: CPT

## 2024-12-27 PROCEDURE — 87660 TRICHOMONAS VAGIN DIR PROBE: CPT

## 2024-12-27 PROCEDURE — 87529 HSV DNA AMP PROBE: CPT | Mod: 91

## 2024-12-27 PROCEDURE — 88142 CYTOPATH C/V THIN LAYER: CPT

## 2024-12-27 PROCEDURE — 87340 HEPATITIS B SURFACE AG IA: CPT

## 2024-12-27 ASSESSMENT — FIBROSIS 4 INDEX: FIB4 SCORE: 0.51

## 2024-12-27 NOTE — PROGRESS NOTES
Patient here for annual exam  Last pap done/result: per pt does not remember ( would like one done today )   LMP: 12/6/24  BCM: pt does not desire   Phone number /Pharmacy verified

## 2024-12-27 NOTE — PROGRESS NOTES
HPI Comments: Desire Mack is a 34 y.o. y.o. female who presents for her Gynecologic Exam.       Pt has no complaints.   Pt is sexually active with one male partner  She is currently not using BCM. She has been trying for at least 10 yrs to become pregnant. She has had 2 different partners in that time with no success. She is currently looking into surrogacy.  LMP: 12/6/2024  Last pap/results: unknown/abnormal  .  ROS: Patient is feeling well. No dyspnea or chest pain on exertion. No Abdominal pain, change in bowel habits, black or bloody stools. No urinary sx. GYN ROS:normal menses, no abnormal bleeding, pelvic pain or discharge,   Denies breast tenderness, mass, discharge, changes in size or contour, or abnormal cyclic discomfort.   No neurological complaints.  Pertinent positives documented in HPI and all other systems reviewed & are negative    All PMH, PSH, allergies, social history and FH reviewed and updated today:  History reviewed. No pertinent past medical history.  History reviewed. No pertinent surgical history.  Patient has no known allergies.  Social History     Socioeconomic History    Marital status: Single    Number of children: 1   Tobacco Use    Smoking status: Former     Types: Cigarettes    Smokeless tobacco: Never    Tobacco comments:     Quit 2014; smoked x 10 years; 1 pack every 3 weeks   Vaping Use    Vaping status: Some Days    Substances: Nicotine, Flavoring    Devices: Pre-filled or refillable cartridge, Pre-filled pod   Substance and Sexual Activity    Alcohol use: Yes     Comment: couple times a month; 4 shots    Drug use: Yes     Types: Marijuana     Comment: Previous marijuana use; last use in 2021. Denies illicit or IVDU    Sexual activity: Yes     Partners: Male     Birth control/protection: None   Social History Narrative    Lives with daughter and mother. No pets     Family History   Problem Relation Age of Onset    Diabetes Mother     No Known Problems Father     Cancer  "Maternal Aunt         mesothelioma    Diabetes Paternal Grandmother     No Known Problems Daughter      Medications:   Current Outpatient Medications Ordered in Epic   Medication Sig Dispense Refill    propranolol (INDERAL) 10 MG Tab Take 1 Tablet by mouth 3 times a day as needed (anxiety). (Patient not taking: Reported on 12/27/2024) 90 Tablet 1    hydrOXYzine HCl (ATARAX) 25 MG Tab Take 0.5-2 Tablets by mouth 3 times a day as needed for Anxiety (sleep). (Patient not taking: Reported on 12/27/2024) 60 Tablet 1    sertraline (ZOLOFT) 50 MG Tab Take 1 Tablet by mouth every day. (Patient not taking: Reported on 12/27/2024) 30 Tablet 2    clindamycin (CLEOCIN) 1 % Solution Apply fingertip amount to affected areas twice daily (Patient not taking: Reported on 12/27/2024) 60 mL 2     No current Epic-ordered facility-administered medications on file.          Objective:   Vital measurements:  /70 (BP Location: Right arm, Patient Position: Sitting, BP Cuff Size: Adult)   Ht 5' 2\"   Wt 150 lb   Body mass index is 27.44 kg/m². (Goal BM I>18 <25)    Physical Exam   Nursing note and vitals reviewed.  Constitutional: She is oriented to person, place, and time. She appears well-developed and well-nourished. No distress.     Neck: Normal range of motion. Neck supple. No tracheal deviation present. No thyromegaly present.     Pulmonary/Chest: Effort normal and breath sounds normal. No respiratory distress. She has no wheezes. She has no rales. She exhibits no tenderness.     Cardiovascular: Regular, rate and rhythm. No JVD.    Abdominal: Soft. Bowel sounds are normal. She exhibits no distension and no mass. No tenderness. She has no rebound and no guarding.     Breast:  Symmetrical, normal consistency without masses., No dimpling or skin changes, Normal nipples without discharge, no axillary lymphadenopathy    Genitourinary:  Pelvic exam was performed with patient supine.  External genitalia with no abnormal pigmentation, " labial fusion,rash, tenderness, lesion or injury to the labia bilaterally.  Vagina is moist with no lesions, foul discharge, erythema, tenderness or bleeding. No foreign body around the vagina or signs of injury.   Cervix exhibits no motion tenderness, no discharge and no friability.   Uterus is midline not deviated, not enlarged, not fixed and not tender.  Right adnexum displays no mass, no tenderness and no fullness. Left adnexum displays no mass, no tenderness and no fullness.     Musculoskeletal: Normal range of motion. She exhibits no edema and no tenderness.     Lymphadenopathy: She has no cervical adenopathy.     Neurological: She is alert and oriented to person, place, and time. She exhibits normal muscle tone.     Skin: Skin is warm and dry. No rash noted. She is not diaphoretic. No erythema. No pallor.     Psychiatric: She has a normal mood and affect. Her behavior is normal. Judgment and thought content normal.               Assessment:     1. Encounter for annual routine gynecological examination  VAGINAL PATHOGENS DNA PANEL    THINPREP PAP W/HPV AND CTNG      2. Cervical cancer screening  THINPREP PAP W/HPV AND CTNG      3. Possible exposure to STI  VAGINAL PATHOGENS DNA PANEL    HIV AG/AB COMBO ASSAY SCREENING    RPR (SYPHILIS)    HSV-1 AND HSV-2 SUBTYPE, PCR    HEP B SURFACE ANTIGEN    HEP C VIRUS ANTIBODY          Assessment:  well woman      Plan:     1. Encounter for annual routine gynecological examination  VAGINAL PATHOGENS DNA PANEL    THINPREP PAP W/HPV AND CTNG      2. Cervical cancer screening  THINPREP PAP W/HPV AND CTNG      3. Possible exposure to STI  VAGINAL PATHOGENS DNA PANEL    HIV AG/AB COMBO ASSAY SCREENING    RPR (SYPHILIS)    HSV-1 AND HSV-2 SUBTYPE, PCR    HEP B SURFACE ANTIGEN    HEP C VIRUS ANTIBODY          Desire Mack is a 34 y.o.  female who presents for her annual gynecologic exam.   # Preventative health care:   --Breast self awareness discussed. Mammogram no  (annually starting at age 40).  --Cervical cancer screening. Last Pap unknown. Collected today. HPV also sent. I will follow up with patient once results are back as per ASCCP guidelines.   --Smoking no, vaping.   --Colorectal screening not indicated.   --Immunizations are up to date.  --Diet, exercise, vitamin supplementation, and hydration discussed.  # Contraception: no  # STD screening: STD panel, GCCT, vaginal pathogen  # Follow up prn or annually.    CAROLINE Rincon.

## 2024-12-28 LAB
C TRACH DNA GENITAL QL NAA+PROBE: NEGATIVE
N GONORRHOEA DNA GENITAL QL NAA+PROBE: NEGATIVE
SPECIMEN SOURCE: NORMAL
T PALLIDUM AB SER QL IA: NONREACTIVE

## 2024-12-29 LAB
HSV1 DNA CSF QL NAA+PROBE: NOT DETECTED
HSV2 DNA CSF QL NAA+PROBE: NOT DETECTED
SPECIMEN SOURCE: NORMAL

## 2025-01-02 LAB
HPV I/H RISK 1 DNA SPEC QL NAA+PROBE: DETECTED
HPV16 DNA CVX QL PROBE+SIG AMP: NOT DETECTED
HPV18 DNA CVX QL PROBE+SIG AMP: NOT DETECTED
SPECIMEN SOURCE: ABNORMAL
SPECIMEN SOURCE: NORMAL
THINPREP PAP, CYTOLOGY NL11781: NORMAL

## 2025-01-03 ENCOUNTER — PATIENT MESSAGE (OUTPATIENT)
Dept: OBGYN | Facility: CLINIC | Age: 35
End: 2025-01-03
Payer: MEDICAID

## 2025-01-03 RX ORDER — FLUCONAZOLE 150 MG/1
150 TABLET ORAL DAILY
Qty: 1 TABLET | Refills: 1 | Status: SHIPPED | OUTPATIENT
Start: 2025-01-03

## 2025-01-03 NOTE — PROGRESS NOTES
MATTHEW Mack to  [facility] (supporting ANILA Rincon)         12/31/24 10:27 AM  Is there any way I can get a pill form medication for the yeast infection?    Consulted with provider and agreed. Ordered pended for provider to sign.

## 2025-02-23 ENCOUNTER — HOSPITAL ENCOUNTER (EMERGENCY)
Facility: MEDICAL CENTER | Age: 35
End: 2025-02-24
Attending: EMERGENCY MEDICINE
Payer: MEDICAID

## 2025-02-23 VITALS
BODY MASS INDEX: 25.9 KG/M2 | SYSTOLIC BLOOD PRESSURE: 113 MMHG | RESPIRATION RATE: 18 BRPM | OXYGEN SATURATION: 95 % | HEIGHT: 63 IN | DIASTOLIC BLOOD PRESSURE: 66 MMHG | HEART RATE: 100 BPM | WEIGHT: 146.16 LBS | TEMPERATURE: 97.4 F

## 2025-02-23 DIAGNOSIS — L02.412 ABSCESS OF LEFT AXILLA: ICD-10-CM

## 2025-02-23 PROCEDURE — 99283 EMERGENCY DEPT VISIT LOW MDM: CPT

## 2025-02-23 ASSESSMENT — FIBROSIS 4 INDEX: FIB4 SCORE: 0.51

## 2025-02-24 PROCEDURE — 700102 HCHG RX REV CODE 250 W/ 637 OVERRIDE(OP): Mod: UD | Performed by: EMERGENCY MEDICINE

## 2025-02-24 PROCEDURE — 303977 HCHG I & D

## 2025-02-24 PROCEDURE — A9270 NON-COVERED ITEM OR SERVICE: HCPCS | Mod: UD | Performed by: EMERGENCY MEDICINE

## 2025-02-24 RX ORDER — SULFAMETHOXAZOLE AND TRIMETHOPRIM 800; 160 MG/1; MG/1
1 TABLET ORAL 2 TIMES DAILY
Qty: 10 TABLET | Refills: 0 | Status: ACTIVE | OUTPATIENT
Start: 2025-02-24 | End: 2025-03-01

## 2025-02-24 RX ORDER — SULFAMETHOXAZOLE AND TRIMETHOPRIM 800; 160 MG/1; MG/1
1 TABLET ORAL ONCE
Status: COMPLETED | OUTPATIENT
Start: 2025-02-24 | End: 2025-02-24

## 2025-02-24 RX ADMIN — SULFAMETHOXAZOLE AND TRIMETHOPRIM 1 TABLET: 800; 160 TABLET ORAL at 00:23

## 2025-02-24 NOTE — ED TRIAGE NOTES
"Chief Complaint   Patient presents with    Abscess     Patient complaining of an abscess under her left armpit. Patient states it has been present for about a week.       Pt is alert and oriented, speaking in full sentences, follows commands and responds appropriately to questions. Resperations are even and unlabored.      Pt placed in lobby. Pt educated on triage process. Pt encouraged to alert staff for any changes.     Patient and staff wearing appropriate PPE.    /66   Pulse 100   Temp 36.3 °C (97.4 °F) (Temporal)   Resp 18   Ht 1.6 m (5' 3\")   Wt 66.3 kg (146 lb 2.6 oz)   SpO2 95%    "

## 2025-02-24 NOTE — ED PROVIDER NOTES
"                                                        ED Provider Note    CHIEF COMPLAINT  Chief Complaint   Patient presents with    Abscess     Patient complaining of an abscess under her left armpit. Patient states it has been present for about a week.        [facility]    Primary care provider: Yu Waldrop M.D.   History obtained from: Patient  History limited by: None     Desire Mack is a 34 y.o. female who presents to the ED with boyfriend complaining of left axilla abscess that flared up about a week and a half ago.  Patient reports previous similar symptoms and is requesting drainage.  She reports one of them \"popped\" but the other one is still swollen and painful.  She denies any other symptoms.  She denies fever/chills/shortness of breath or difficulty breathing/nausea/vomiting/diarrhea/dysuria or possibility of pregnancy.    REVIEW OF SYSTEMS  Please see HPI for pertinent positives/negatives.  All other systems reviewed and are negative.     PAST MEDICAL HISTORY  No past medical history on file.     SURGICAL HISTORY  History reviewed. No pertinent surgical history.     SOCIAL HISTORY  Social History     Tobacco Use    Smoking status: Former     Types: Cigarettes    Smokeless tobacco: Never    Tobacco comments:     Quit 2014; smoked x 10 years; 1 pack every 3 weeks   Vaping Use    Vaping status: Some Days    Substances: Nicotine, Flavoring    Devices: Pre-filled or refillable cartridge, Pre-filled pod   Substance and Sexual Activity    Alcohol use: Yes     Comment: couple times a month; 4 shots    Drug use: Yes     Types: Marijuana     Comment: Previous marijuana use; last use in 2021. Denies illicit or IVDU    Sexual activity: Yes     Partners: Male     Birth control/protection: None        FAMILY HISTORY  Family History   Problem Relation Age of Onset    Diabetes Mother     No Known Problems Father     Cancer Maternal Aunt         mesothelioma    Diabetes Paternal Grandmother     No Known " "Problems Daughter         CURRENT MEDICATIONS  Home Medications       Reviewed by Fidel Lorenzo R.N. (Registered Nurse) on 02/23/25 at 2134  Med List Status: Not Addressed     Medication Last Dose Status   clindamycin (CLEOCIN) 1 % Solution  Active   fluconazole (DIFLUCAN) 150 MG tablet  Active   hydrOXYzine HCl (ATARAX) 25 MG Tab  Active   propranolol (INDERAL) 10 MG Tab  Active   sertraline (ZOLOFT) 50 MG Tab  Active                     ALLERGIES  No Known Allergies     PHYSICAL EXAM  VITAL SIGNS: /66   Pulse 100   Temp 36.3 °C (97.4 °F) (Temporal)   Resp 18   Ht 1.6 m (5' 3\")   Wt 66.3 kg (146 lb 2.6 oz)   SpO2 95%   BMI 25.89 kg/m²  @HANK[[id number]::@     Pulse ox interpretation: 95% I interpret this pulse ox as normal     Constitutional: Well developed, well nourished, alert in no apparent distress, nontoxic appearance    HENT: No external signs of trauma, normocephalic    Eyes: No discharge, no icterus     Neck: No stridor    Cardiovascular: Strong distal pulses and good perfusion    Thorax & Lungs: No respiratory distress    Extremities: No cyanosis, no edema, no gross deformity, good ROM, intact distal pulses with brisk cap refill    Skin: Warm, dry, no pallor/cyanosis, left axilla with multiple small lumps including 1 approximately 2 cm in size in the inferior portion with tenderness to palpation and trace overlying erythema   Neuro: A/O times 3, no focal deficits noted    Psychiatric: Cooperative, normal mood and affect, normal judgement, appropriate for clinical situation        DIAGNOSTIC STUDIES / PROCEDURES    Verbal consent was obtained for incision and drainage.  The area of the incision site was sterilely prepped/draped and locally infiltrated with 3 mL of 0.5% Marcaine with epi.  An incision was then made with a #11 blade over the apex of the lesion.  Small amount of purulent material was expressed.  Loculations were broken up using a hemostat and the cavity was packed with sterile gauze.  " The patient tolerated the procedure without complications.        LABS  All labs reviewed by me.     Results for orders placed or performed during the hospital encounter of 12/27/24   THINPREP PAP W/HPV AND CTNG    Collection Time: 12/27/24  9:27 AM   Result Value Ref Range    ThinPrep Pap, Cytology SEE BELOW    VAGINAL PATHOGENS DNA PANEL    Collection Time: 12/27/24  9:27 AM   Result Value Ref Range    Candida species DNA Probe POSITIVE (A) Negative    Trichamonas vaginalis DNA Probe Negative Negative    Gardnerella vaginalis DNA Probe Negative Negative   Chlamydia/GC PCR Assoc.W/Thinprep    Collection Time: 12/27/24  9:27 AM   Result Value Ref Range    C. trachomatis by PCR Negative Negative    N. gonorrhoeae by PCR Negative Negative    Source Cervical    HPV GENOTYPE    Collection Time: 12/27/24  9:27 AM   Result Value Ref Range    HPV Source Cervical     HPV Genotype 16 by TMA Not Detected     HPV Genotype 18/45 by TMA Not Detected    HPV HIGH-RISK W/RFLX GENOTYPE    Collection Time: 12/27/24  9:27 AM   Result Value Ref Range    Source Cervical     HPV by PCR ThinPrep Detected (A)         RADIOLOGY  I have independently interpreted the diagnostic imaging associated with this visit and am waiting the final reading from the radiologist.     No orders to display          COURSE & MEDICAL DECISION MAKING  Nursing notes, VS, PMSFHx reviewed in chart.     Review of past medical records shows the patient had outpatient visit with women's health on December 27, 2024 for annual routine gynecological exam, cervical cancer screening, possible exposure to STI.  Patient was last seen in this ED April 21, 2024 for flulike symptoms and also left armpit abscess      Differential diagnoses considered include but are not limited to: Hidradenitis suppurativa, abscess, cellulitis, folliculitis      ED Observation Status? No; Patient does not meet criteria for ED Observation.       Discussion of management with other [facility] or  appropriate source(s): None     Escalation of care considered, and ultimately not performed: Laboratory analysis.     Decision tools and prescription drugs considered including, but not limited to: Antibiotics   .        History and physical exam as above.  This is a generally healthy 34-year-old female patient who presents to the ED with above complaints.  Patient reports recurrent episodes of similar symptoms and requested drainage today.  Patient likely with recurrent hidradenitis suppurativa with abscess formation.  I&D was performed as above without any complications.  Patient will be started on Bactrim.  I discussed the findings with patient.  This is a very pleasant patient in no acute distress and nontoxic in appearance.  At this time, I have low clinical suspicion for concerning pathology such as sepsis or necrotizing fasciitis.  She was advised on supportive home care, outpatient follow-up and return to ED precautions.  Patient verbalized understanding and agreed with plan of care with no further questions or concerns.      The patient is referred to a primary physician for blood pressure management, diabetic screening, and for all other preventative health concerns.       FINAL IMPRESSION  1. Abscess of left axilla Acute          DISPOSITION  Patient will be discharged home in stable condition.       FOLLOW UP  Yu Waldrop M.D.  [address] [address]  [phone]    Call today      [facility], Emergency Dept  [address] [facility] [id number] [phone]    If symptoms worsen         OUTPATIENT MEDICATIONS  Discharge Medication List as of 2/24/2025 12:30 AM        START taking these medications    Details   sulfamethoxazole-trimethoprim (BACTRIM DS) 800-160 MG tablet Take 1 Tablet by mouth 2 times a day for 5 days., Disp-10 Tablet, R-0, Normal                Electronically signed by: Chandana Lowe D.O., 2/23/2025 10:51 PM      Portions of this record  were made with voice recognition software.  Despite my review, errors may remain.  Please interpret this chart in the appropriate context.

## 2025-04-16 ENCOUNTER — PATIENT MESSAGE (OUTPATIENT)
Dept: MEDICAL GROUP | Facility: MEDICAL CENTER | Age: 35
End: 2025-04-16
Payer: MEDICAID